# Patient Record
Sex: FEMALE | Race: WHITE | NOT HISPANIC OR LATINO | Employment: UNEMPLOYED | URBAN - METROPOLITAN AREA
[De-identification: names, ages, dates, MRNs, and addresses within clinical notes are randomized per-mention and may not be internally consistent; named-entity substitution may affect disease eponyms.]

---

## 2019-12-06 DIAGNOSIS — J84.9 ILD (INTERSTITIAL LUNG DISEASE) (H): Primary | ICD-10-CM

## 2020-01-10 NOTE — PROGRESS NOTES
[facility]- Interstitial Lung Disease Clinic    Assessment and Plan:  Ciera Chong is a 59 year old female with a history of seronegative RA vs psoriatic arthritis, positive FRED, breast cancer s/p bilateral mastectomy and chemotherapy (2006), anxiety, chronic pain on chronic opioids, gastric ulcers, LVH with diastolic CHF, and ILD, presenting to clinic today for evaluation of ILD.    Possible mild ILD, moderately severe restrictive lung disease, mild diffusion defect: Patient with a history of RA, imaging showing mild air trapping on imaging, possible ILD, with some improvement in GGO since last CT. She may have some ILD, such as RA-ILD causing small airways disease or HP, which is resolving. Given her frequent changes in RA medications, the improvement in today's imaging could be duet to more effective RA medication. The disease is quite mild, however, and is unlikely to explain her whole restriction present on PFTs. I believe her elevated right diaphragm may better explain her restriction, and may be a paralyzed diaphragm. No significant exposures identified. Obesity likely also contributing to restriction.   -Check ILD panel  -Will discuss case at ILD conference, and contact patient with results  -Continue current RA treatment  -Will initiate fluticasone inhaler for small airways disease, consider azithromycin and montelukast as well, pending ILD conference discussion. Advised to use a spacer with this  -continue PRN albuterol, gets benefit from this.   -Advised to get rid of down comforter, if she has one.   -work of of paralyzed kasi-diaphragm as below  -Referred to pulmonary rehab  -supplemental O2 as below.   -up to date on both pneumonia vaccines (2016 and 2017)  -Annual flu vaccination (up to date)    Hypoxia with exertion: Needed 2 LPM with exertion today. Already has this at home, as well as 4 LPM at night. Typically only uses nightly O2.  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 Likely contributing to restriction.   -Check SNIFF test  -May need thoracic surgery referral, vs bipap/cpap therapy at least at night.     Follow up in 3 months with PFTs, sooner if needed.     Roge Farrell MD   of Medicine  Division of Pulmonary, Critical Care, Allergy, and Sleep Medicine  Pager [phone]    Addendum:   Discussed at ILD conference. Minimal NSIP changes on CT as well as some mild air trapping. Oxygen requirement is more likely due to elevated diaphragm. At this point, would change inhaler to ICS/LABA (currently on fluticasone), would change to Breo or Advair, depending on insurance coverage. Continue current RA meds. Monitor PFTs in 3 months.     Roge Farrell MD   ______________________________________________________________  CC: \"ILD\"      HPI:   Ciera Chong is a 59 year old female with a history of seronegative RA vs psoriatic arthritis, positive FRED, breast cancer s/p bilateral mastectomy and chemotherapy (2006), anxiety, chronic pain on chronic opioids, gastric ulcers, LVH with diastolic CHF, and ILD, presenting to clinic today for evaluation of ILD.    She has previously followed with rheumatology and pulmonary in [place]. At most recent rheumatology visit in November, she mentioned that her dyspnea was worsening.     In regards to seronegative RA, she has a history of positive FRED with negative DNA and GAYLE antibodies. She also has some skin and nail changes concerning for psoriasis, with a family history of psoriasis, so psoriatic arthritis is also possible. She has tried many prior medications, including remicade (developed breast cancer while on), leflunomide (stopped due to LFT abnormalities), sulfasalazine, hydroxychloroquine, Methotrexate (intolerant), Otezla (not tolerated), Cimzia, IV Orencia (not effective), and prednisone. She is currently on " Cimzia and prednisone.     She last saw pulmonary (Beba Delarosa, NP) in Oct 2018. At that visit, it was noted that she was on 4 LPM supplemental O2 at night only. She was being treated with albuterol, and continued to complain of progressive dyspnea as well as daily cough with clear mucous. A CT chest was ordered, which showed stable GGO an minimal thickening of the interlobular septa concerning for NSIP. She presents today for a second opinion.     Ms. Chong states that a few years ago, she started to notice wheezing. She was given an inhaler to try. The inhaler did seem to help. Unfortunately, her breathing continued to worsen in the intervening years. She developed more dyspnea about 3 years ago. She was started on oxygen 4 LPM in about 2016, which has been stable. She gets out of breath with most activities, including showering or cooking. She feels she could walk for about 5 minutes without having to stop, but some of her walk distance is limited due to back pain. She does not use stairs. She typically uses oxygen only at night, but sometimes uses it during the day as well. She does have a cough, which tends to be productive of tan sputum, no hemoptysis. Cough occurs throughout the day, without noted exacerbating or alleviating factors, apart from resting. She does continue to wheeze, which often occurs with walking or activity. It typically doesn't happen at rest. She denies chest pain, palpitations. She does have mild LE edema, which is a bit worse than typical for her, in the last 6 months or so. She does have burning abdominal pain, heartburn (on nexium, which doesn't seem to work). No nausea or vomiting. She has some numbness in the fingers and toes, which seems to be worsening. This also started about 6 months ago, and she feels it is due to her back pain. She denies injury in the back, but has had prior back surgeries. She does have chronic neck, back, and foot pain. She does have some swelling of  her fingers as well, and her feet. No raynaud's phenomenon. She denies mouth ulcers, but has some sores in mouth. She also complains of dry mouth and dry eyes. She denies current skin rash/change.     She does have an albuterol inhaler, which she uses about 1 time per day, with benefit. She denies any personal history of asthma as a child.     She denies any family history of lung or autoimmune disease.     Exposure History:  Tobacco: former use, quit 2000. 7.5 pack years (1/2 ppd for 15 years)  Other inhaled substances: No marijuana, no vaping. No asbestos, sandblasting, welding, TB, spray painting.   Occupation: Not currently working. 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   Pets: She has a dog at home. No birds.   Allergies: No environmental allergies.   Hobbies: No significant outdoor exposures.   Travel: None  Home: Lives in house with . There are some mold issues in the bathroom, which were abated in Nov 2019. No change in breathing since abatement. She is unsure if she uses a down comforter. No feather pillow use. No hot tub use, no musical instrument use.     Allergies: Reviewed in EMR    Current medications: Reviewed in EMR  Current Outpatient Medications   Medication Sig Dispense Refill     albuterol (PROAIR HFA/PROVENTIL HFA/VENTOLIN HFA) 108 (90 Base) MCG/ACT inhaler Inhale 1-2 puffs into the lungs       amLODIPine (NORVASC) 5 MG tablet Take 5 mg by mouth       buPROPion (WELLBUTRIN SR) 200 MG 12 hr tablet Take 200 mg by mouth       cetirizine (ZYRTEC) 10 MG tablet Take 10 mg by mouth       cyclobenzaprine (FLEXERIL) 10 MG tablet Take 10 mg by mouth       diclofenac (VOLTAREN) 1 % topical gel        DULoxetine (CYMBALTA) 30 MG capsule Take 30 mg by mouth       DULoxetine (CYMBALTA) 60 MG capsule Take 60 mg by mouth       esomeprazole (NEXIUM) 40 MG DR capsule TAKE 1 CAPSULE BY MOUTH TWICE DAILY       fluticasone (FLONASE) 50 MCG/ACT nasal spray Spray 2 sprays in  nostril       melatonin 5 MG tablet Take 5-15 mg by mouth       mirtazapine (REMERON) 7.5 MG tablet Take 7.5 mg by mouth       Multiple Vitamin (THERA-TABS) TABS Take 1 tablet by mouth       oxyCODONE (OXYCONTIN) 20 MG 12 hr tablet Take 20 mg by mouth       oxyCODONE-acetaminophen (PERCOCET) 5-325 MG tablet Take 1-2 tablets by mouth       polyethylene glycol (MIRALAX/GLYCOLAX) powder Take 17 g by mouth       pravastatin (PRAVACHOL) 20 MG tablet Take 20 mg by mouth       predniSONE (DELTASONE) 1 MG tablet Take 5 mg by mouth       pregabalin (LYRICA) 150 MG capsule TAKE 1 CAPSULE BY MOUTH THREE TIMES DAILY       propranolol (INDERAL) 40 MG tablet Take 40 mg by mouth       sucralfate (CARAFATE) 1 GM/10ML suspension TAKE 2 TEASPOONSFUL (10ML) BY MOUTH 4 TIMES DAILY.       SUMAtriptan (IMITREX) 50 MG tablet Take 50 mg by mouth       zolpidem ER (AMBIEN CR) 6.25 MG CR tablet TAKE 1 TABLET BY MOUTH ONCE DAILY AT BEDTIME AS NEEDED FOR SLEEP         Histories: Personally reviewed during this visit.   Past Medical History:   Diagnosis Date     Anxiety      Breast cancer (H)      Chronic pain      Diastolic CHF (H)      Gastric ulcer      ILD (interstitial lung disease) (H)      LVH (left ventricular hypertrophy)      Seronegative rheumatoid arthritis (H)      Past Surgical History:   Procedure Laterality Date     AS REVISE BREAST RECONSTRUCTION       MASTECTOMY, BILATERAL       ROTATOR CUFF REPAIR RT/LT       SPINE SURGERY       Family History   Problem Relation Age of Onset     Esophageal Cancer Father      Heart Disease Father      Heart Disease Brother      Breast Cancer Sister      Breast Cancer Maternal Aunt         x2 aunts     Ovarian Cancer Maternal Aunt      Social History     Socioeconomic History     Marital status: Single     Spouse name: Not on file     Number of children: Not on file     Years of education: Not on file     Highest education level: Not on file   Occupational History     Not on file   Social Needs  "    Financial resource strain: Not on file     Food insecurity:     Worry: Not on file     Inability: Not on file     Transportation needs:     Medical: Not on file     Non-medical: Not on file   Tobacco Use     Smoking status: Former Smoker     Packs/day: 0.50     Years: 15.00     Pack years: 7.50     Last attempt to quit: 2000     Years since quittin.0     Smokeless tobacco: Never Used   Substance and Sexual Activity     Alcohol use: Not Currently     Drug use: Not Currently     Sexual activity: Not on file   Lifestyle     Physical activity:     Days per week: Not on file     Minutes per session: Not on file     Stress: Not on file   Relationships     Social connections:     Talks on phone: Not on file     Gets together: Not on file     Attends Tenri[employer] service: Not on file     Active member of club or organization: Not on file     Attends meetings of clubs or organizations: Not on file     Relationship status: Not on file     Intimate partner violence:     Fear of current or ex partner: Not on file     Emotionally abused: Not on file     Physically abused: Not on file     Forced sexual activity: Not on file   Other Topics Concern     Parent/sibling w/ CABG, MI or angioplasty before 65F 55M? Not Asked   Social History Narrative     Not on file       ROS: Complete 12 point ROS negative unless mentioned in HPI    Physical Exam:  /78   Pulse 76   Resp 18   Ht 1.702 m (5' 7\")   Wt 111.1 kg (245 lb)   SpO2 96%   BMI 38.37 kg/m      General: Sitting in the chair in NAD  HEENT: anicteric, dry mucosa. Poor dentition  Neck: trachea midline   Lymphatic: no cervical or supraclavicular lymphadenopathy   Chest: very mild crackles at right lung base medially, otherwise, CTAB, no wheezing, rhonchi. Normal WOB, on 2 LPM. Later during visit had some expiratory wheeze, but was not present during my exam.   Cardiac: RRR no murmurs  Abdomen: Soft, obese, non tender, active BS  Extremities: No LE Edema, moving " all extremities. No digital clubbing. Some pitting of finger nails. Mild ulnar deviation of fingers noted. Some swelling of PIP and MCP joints in hand, but no synovitis noted. No effusion on knees.   Neuro: A&Ox3, no focal defects. Speech/language wnl.   Skin: no rash noted on limited exam  Psych: normal affect.     Labs and Radiology: I have reviewed the results with the patient today.   All laboratory data reviewed   11/8/19 [facility]:  MPO, PR3, RF, AntiJo1, Scl-70, SSA, SSB, all negative  CCP 0.8  RNP 1.5  CRP 1.19  ESR 40  Cr 1.06    8/2/19 [facility]  WBC 12.3 Hgb 14 plts 421.     10/12/18 [facility]  FRED positive, 1:320 homogeneous   SCl-70 1.4 (still negative)  Smith Ab 1.9 (still negative)  Anti sydney 1 negative  SSA 0.5, SSB 0.6 (still negative)  RNP 0.7 (still negative)  Anti DNA negative    All cardiac studies reviewed by me.   12/28/18 Echo ([facility]): The left ventricle is normal in size. Left ventricular systolic function is normal. The estimated ejection fraction is 55-60%. Left ventricular segmental wall motion is normal. The right ventricle is normal in size. Normal right ventricular systolic function. The mitral valve is structurally normal. There is trace mitral regurgitation. Compared to prior study, there is no significant change.    All imaging studies reviewed by me.   1/13/20 CT chest ([facility]): 1. Decreased groundglass opacities throughout the lungs with mild residual mosaic attenuation and slight air trapping on expiratory  images. Findings are nonspecific but may be seen with small airways disease such as asthma or bronchiolitis obliterans and with interstitial lung disease such as hypersensitivity pneumonitis. 2. Hepatic steatosis. 3. Elevated right hemidiaphragm possibly diaphragmatic paralysis.   11/21/18 CT chest ([facility]): 1. Stable appearing ground-glass opacities primarily within the dependent portions of the lung parenchyma with minimal thickening of the interlobular septa.   Findings are compatible with an NSIP pattern.  No evidence of a UIP pattern. 2. No focal consolidation or pleural effusions.  3. Stable elevation right hemidiaphragm with minimal right basilar atelectasis. 4. No suspicious adenopathy. 5. Moderate broad-based disc osteophyte complex eccentric to the left centered at the T8-9 disc space mildly effacing the cord, chronic.  Correlation with radiculopathy in this region.  11/3/17 CT Chest ([facility]): 1. Central ground-glass opacities resolved with mild residual in the periphery of the lungs, suggestive of resolving atypical pneumonia, interstitial pneumonitis such as DIP or NSIP, eosinophilic pneumonia, and hypersensitivity pneumonitis. 2. Elevated right hemidiaphragm with chronic right lower lobe atelectasis. 3. Dilated or enlarged esophagus.  There may be reflux or achalasia.  Recommend esophagram or EGD correlation.     PFT's:  Pulmonary Function Testing: personally reviewed.   Date and Site FEV1 FVC FEV1/FVC TLC RV DLCO   8/17/17 [facility] 2.07 (70%) 2.43 (64%) 85% 4.13 (75%)  15.87 (58%)   11/20/18 [facility] 1.67 (57%) 1.94 (51%) 86% 3.51 (63%)  12.13 (45%)   1/13/20 [facility] 1.48 (56%) 1.76 (53%) 84% 4.21 (77%) 2.19 (108%) 15.07 (66%)   Today's PFT interpretation: Moderately severe restriction present, not significantly worse than 2018 PFTs, with mild diffusion defect, improved from prior.     1/13/20 Six minute walk: Walk started on RA, but patient desaturated to 88%, placed on 2 LPM. Walk continued, patient had significant desaturation, but no hypoxia on 2 LPM (SpO2 dropped from 96 to 92% on 2 LPM, 88% on RA). Walk distance reduced, 265 m vs .

## 2020-01-13 ENCOUNTER — ANCILLARY PROCEDURE (OUTPATIENT)
Dept: CT IMAGING | Facility: CLINIC | Age: 60
End: 2020-01-13
Attending: INTERNAL MEDICINE
Payer: COMMERCIAL

## 2020-01-13 ENCOUNTER — OFFICE VISIT (OUTPATIENT)
Dept: PULMONOLOGY | Facility: CLINIC | Age: 60
End: 2020-01-13
Attending: PSYCHIATRY & NEUROLOGY
Payer: COMMERCIAL

## 2020-01-13 VITALS
WEIGHT: 245 LBS | BODY MASS INDEX: 38.45 KG/M2 | OXYGEN SATURATION: 96 % | SYSTOLIC BLOOD PRESSURE: 124 MMHG | DIASTOLIC BLOOD PRESSURE: 78 MMHG | HEART RATE: 76 BPM | HEIGHT: 67 IN | RESPIRATION RATE: 18 BRPM

## 2020-01-13 DIAGNOSIS — J98.4 SMALL AIRWAYS DISEASE: ICD-10-CM

## 2020-01-13 DIAGNOSIS — J98.6 PARALYZED HEMIDIAPHRAGM: ICD-10-CM

## 2020-01-13 DIAGNOSIS — R19.8 ABDOMINAL BURNING SENSATION IN RIGHT UPPER QUADRANT: ICD-10-CM

## 2020-01-13 DIAGNOSIS — J84.9 ILD (INTERSTITIAL LUNG DISEASE) (H): Primary | ICD-10-CM

## 2020-01-13 DIAGNOSIS — R09.02 HYPOXIA: ICD-10-CM

## 2020-01-13 DIAGNOSIS — J84.9 ILD (INTERSTITIAL LUNG DISEASE) (H): ICD-10-CM

## 2020-01-13 LAB
6 MIN WALK (FT): 525 FT
6 MIN WALK (M): 160 M
ALBUMIN SERPL-MCNC: 3.3 G/DL (ref 3.4–5)
ALP SERPL-CCNC: 96 U/L (ref 40–150)
ALT SERPL W P-5'-P-CCNC: 43 U/L (ref 0–50)
AST SERPL W P-5'-P-CCNC: 27 U/L (ref 0–45)
BILIRUB DIRECT SERPL-MCNC: 0.1 MG/DL (ref 0–0.2)
BILIRUB SERPL-MCNC: 0.2 MG/DL (ref 0.2–1.3)
CK SERPL-CCNC: 67 U/L (ref 30–225)
CRP SERPL-MCNC: 10.2 MG/L (ref 0–8)
ERYTHROCYTE [DISTWIDTH] IN BLOOD BY AUTOMATED COUNT: 13.3 % (ref 10–15)
ERYTHROCYTE [SEDIMENTATION RATE] IN BLOOD BY WESTERGREN METHOD: 31 MM/H (ref 0–30)
HCT VFR BLD AUTO: 39.3 % (ref 35–47)
HGB BLD-MCNC: 13 G/DL (ref 11.7–15.7)
MCH RBC QN AUTO: 30.6 PG (ref 26.5–33)
MCHC RBC AUTO-ENTMCNC: 33.1 G/DL (ref 31.5–36.5)
MCV RBC AUTO: 93 FL (ref 78–100)
PLATELET # BLD AUTO: 289 10E9/L (ref 150–450)
PROT SERPL-MCNC: 7.7 G/DL (ref 6.8–8.8)
RBC # BLD AUTO: 4.25 10E12/L (ref 3.8–5.2)
WBC # BLD AUTO: 7.2 10E9/L (ref 4–11)

## 2020-01-13 PROCEDURE — 86255 FLUORESCENT ANTIBODY SCREEN: CPT | Performed by: INTERNAL MEDICINE

## 2020-01-13 PROCEDURE — 86235 NUCLEAR ANTIGEN ANTIBODY: CPT | Performed by: INTERNAL MEDICINE

## 2020-01-13 PROCEDURE — 85027 COMPLETE CBC AUTOMATED: CPT | Performed by: INTERNAL MEDICINE

## 2020-01-13 PROCEDURE — 85652 RBC SED RATE AUTOMATED: CPT | Performed by: INTERNAL MEDICINE

## 2020-01-13 PROCEDURE — 82787 IGG 1 2 3 OR 4 EACH: CPT | Performed by: INTERNAL MEDICINE

## 2020-01-13 PROCEDURE — 86331 IMMUNODIFFUSION OUCHTERLONY: CPT | Performed by: INTERNAL MEDICINE

## 2020-01-13 PROCEDURE — 86431 RHEUMATOID FACTOR QUANT: CPT | Performed by: INTERNAL MEDICINE

## 2020-01-13 PROCEDURE — 36415 COLL VENOUS BLD VENIPUNCTURE: CPT | Performed by: INTERNAL MEDICINE

## 2020-01-13 PROCEDURE — 86606 ASPERGILLUS ANTIBODY: CPT | Performed by: INTERNAL MEDICINE

## 2020-01-13 PROCEDURE — 82085 ASSAY OF ALDOLASE: CPT | Performed by: INTERNAL MEDICINE

## 2020-01-13 PROCEDURE — 86140 C-REACTIVE PROTEIN: CPT | Performed by: INTERNAL MEDICINE

## 2020-01-13 PROCEDURE — 82784 ASSAY IGA/IGD/IGG/IGM EACH: CPT | Performed by: INTERNAL MEDICINE

## 2020-01-13 PROCEDURE — 86200 CCP ANTIBODY: CPT | Performed by: INTERNAL MEDICINE

## 2020-01-13 PROCEDURE — 86039 ANTINUCLEAR ANTIBODIES (ANA): CPT | Performed by: INTERNAL MEDICINE

## 2020-01-13 PROCEDURE — 82550 ASSAY OF CK (CPK): CPT | Performed by: INTERNAL MEDICINE

## 2020-01-13 PROCEDURE — 86038 ANTINUCLEAR ANTIBODIES: CPT | Performed by: INTERNAL MEDICINE

## 2020-01-13 PROCEDURE — 80076 HEPATIC FUNCTION PANEL: CPT | Performed by: INTERNAL MEDICINE

## 2020-01-13 RX ORDER — OXYCODONE AND ACETAMINOPHEN 5; 325 MG/1; MG/1
1-2 TABLET ORAL
COMMUNITY
Start: 2020-01-06

## 2020-01-13 RX ORDER — PROPRANOLOL HYDROCHLORIDE 40 MG/1
40 TABLET ORAL
COMMUNITY
Start: 2019-07-19

## 2020-01-13 RX ORDER — ESOMEPRAZOLE MAGNESIUM 40 MG/1
CAPSULE, DELAYED RELEASE ORAL
COMMUNITY
Start: 2019-09-23

## 2020-01-13 RX ORDER — FLUTICASONE PROPIONATE 220 UG/1
1 AEROSOL, METERED RESPIRATORY (INHALATION) 2 TIMES DAILY
Qty: 12 G | Refills: 3 | Status: SHIPPED | OUTPATIENT
Start: 2020-01-13 | End: 2020-10-21

## 2020-01-13 RX ORDER — OXYCODONE HCL 20 MG/1
20 TABLET, FILM COATED, EXTENDED RELEASE ORAL
COMMUNITY
Start: 2020-01-13

## 2020-01-13 RX ORDER — ALBUTEROL SULFATE 90 UG/1
1-2 AEROSOL, METERED RESPIRATORY (INHALATION)
COMMUNITY
Start: 2018-06-28 | End: 2022-05-26

## 2020-01-13 RX ORDER — POLYETHYLENE GLYCOL 3350 17 G/17G
17 POWDER, FOR SOLUTION ORAL
COMMUNITY
Start: 2020-01-09

## 2020-01-13 RX ORDER — ZOLPIDEM TARTRATE 6.25 MG/1
TABLET, FILM COATED, EXTENDED RELEASE ORAL
COMMUNITY
Start: 2019-12-04

## 2020-01-13 RX ORDER — MULTIVITAMIN,THERAPEUTIC
1 TABLET ORAL
COMMUNITY

## 2020-01-13 RX ORDER — PREGABALIN 150 MG/1
CAPSULE ORAL
COMMUNITY
Start: 2020-01-06

## 2020-01-13 RX ORDER — CETIRIZINE HYDROCHLORIDE 10 MG/1
10 TABLET ORAL
COMMUNITY
Start: 2019-07-19

## 2020-01-13 RX ORDER — AMLODIPINE BESYLATE 5 MG/1
5 TABLET ORAL
COMMUNITY
Start: 2019-07-19

## 2020-01-13 RX ORDER — DULOXETIN HYDROCHLORIDE 30 MG/1
30 CAPSULE, DELAYED RELEASE ORAL
COMMUNITY
Start: 2019-07-19

## 2020-01-13 RX ORDER — MIRTAZAPINE 7.5 MG/1
7.5 TABLET, FILM COATED ORAL
COMMUNITY
Start: 2019-07-19 | End: 2020-07-18

## 2020-01-13 RX ORDER — DULOXETIN HYDROCHLORIDE 60 MG/1
60 CAPSULE, DELAYED RELEASE ORAL
COMMUNITY
Start: 2019-07-19

## 2020-01-13 RX ORDER — SUCRALFATE ORAL 1 G/10ML
SUSPENSION ORAL
COMMUNITY
Start: 2019-09-26

## 2020-01-13 RX ORDER — SUMATRIPTAN 50 MG/1
50 TABLET, FILM COATED ORAL
COMMUNITY
Start: 2019-08-23

## 2020-01-13 RX ORDER — PREDNISONE 1 MG/1
5 TABLET ORAL
COMMUNITY
Start: 2019-10-19

## 2020-01-13 RX ORDER — BUPROPION HYDROCHLORIDE 200 MG/1
200 TABLET, EXTENDED RELEASE ORAL
COMMUNITY
Start: 2019-07-19

## 2020-01-13 RX ORDER — CYCLOBENZAPRINE HCL 10 MG
10 TABLET ORAL
COMMUNITY
Start: 2019-11-21

## 2020-01-13 RX ORDER — PRAVASTATIN SODIUM 20 MG
20 TABLET ORAL
COMMUNITY
Start: 2019-07-19

## 2020-01-13 RX ORDER — FLUTICASONE PROPIONATE 50 MCG
2 SPRAY, SUSPENSION (ML) NASAL
COMMUNITY
Start: 2019-07-19

## 2020-01-13 ASSESSMENT — PAIN SCALES - GENERAL: PAINLEVEL: MODERATE PAIN (5)

## 2020-01-13 ASSESSMENT — MIFFLIN-ST. JEOR: SCORE: 1723.94

## 2020-01-13 NOTE — PATIENT INSTRUCTIONS
We will look to do a SNIFF test to test for a paralyzed diaphragm    Blood work today    Breathing tests at follow up in 3 months    We will discuss your case at our ILD conference in the next few weeks, and let you know the results    We will start a fluticasone inhaler (use with spacer) twice a day to help with small airways disease. Take this no matter what, continue to use the albuterol if needed. Rinse out mouth after use.     Referral to pulmonary rehabilitation.

## 2020-01-13 NOTE — NURSING NOTE
Chief Complaint   Patient presents with     Consult     ILD/CTD    Medications reviewed and vital signs taken.   Radha Bennett, CHELY

## 2020-01-13 NOTE — LETTER
1/13/2020       RE: Ciera Chong  [address]  [facility] [address]     Dear Colleague,    Thank you for referring your patient, Ciera Chong, to the [facility] FOR LUNG SCIENCE AND HEALTH at [facility]. Please see a copy of my visit note below.    [facility] Lung Science and Health- Interstitial Lung Disease Clinic    Assessment and Plan:  Ciera Chong is a 59 year old female with a history of seronegative RA vs psoriatic arthritis, positive FRED, breast cancer s/p bilateral mastectomy and chemotherapy (2006), anxiety, chronic pain on chronic opioids, gastric ulcers, LVH with diastolic CHF, and ILD, presenting to clinic today for evaluation of ILD.    Possible mild ILD, moderately severe restrictive lung disease, mild diffusion defect: Patient with a history of RA, imaging showing mild air trapping on imaging, possible ILD, with some improvement in GGO since last CT. She may have some ILD, such as RA-ILD causing small airways disease or HP, which is resolving. Given her frequent changes in RA medications, the improvement in today's imaging could be duet to more effective RA medication. The disease is quite mild, however, and is unlikely to explain her whole restriction present on PFTs. I believe her elevated right diaphragm may better explain her restriction, and may be a paralyzed diaphragm. No significant exposures identified. Obesity likely also contributing to restriction.   -Check ILD panel  -Will discuss case at ILD conference, and contact patient with results  -Continue current RA treatment  -Will initiate fluticasone inhaler for small airways disease, consider azithromycin and montelukast as well, pending ILD conference discussion. Advised to use a spacer with this  -continue PRN albuterol, gets benefit from this.   -Advised to get rid of down comforter, if she has one.   -work of of paralyzed kasi-diaphragm as below  -Referred to pulmonary  "rehab  -supplemental O2 as below.   -up to date on both pneumonia vaccines (2016 and 2017)  -Annual flu vaccination (up to date)    Hypoxia with exertion: Needed 2 LPM with exertion today. Already has this at home, as well as 4 LPM at night. Typically only uses nightly O2.  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 Likely contributing to restriction.   -Check SNIFF test  -May need thoracic surgery referral, vs bipap/cpap therapy at least at night.     Follow up in 3 months with PFTs, sooner if needed.     Roge Farrell MD   of Medicine  Division of Pulmonary, Critical Care, Allergy, and Sleep Medicine  Pager [phone]  ______________________________________________________________  CC: \"ILD\"      HPI:   Ciera Chong is a 59 year old female with a history of seronegative RA vs psoriatic arthritis, positive FRED, breast cancer s/p bilateral mastectomy and chemotherapy (2006), anxiety, chronic pain on chronic opioids, gastric ulcers, LVH with diastolic CHF, and ILD, presenting to clinic today for evaluation of ILD.    She has previously followed with rheumatology and pulmonary in [place]. At most recent rheumatology visit in November, she mentioned that her dyspnea was worsening.     In regards to seronegative RA, she has a history of positive FRED with negative DNA and GAYLE antibodies. She also has some skin and nail changes concerning for psoriasis, with a family history of psoriasis, so psoriatic arthritis is also possible. She has tried many prior medications, including remicade (developed breast cancer while on), leflunomide (stopped due to LFT abnormalities), sulfasalazine, hydroxychloroquine, Methotrexate (intolerant), Otezla (not tolerated), Cimzia, IV Orencia (not effective), and prednisone. She is currently on Cimzia and prednisone.     She last saw pulmonary (Beba Delarosa NP) in Oct 2018. " At that visit, it was noted that she was on 4 LPM supplemental O2 at night only. She was being treated with albuterol, and continued to complain of progressive dyspnea as well as daily cough with clear mucous. A CT chest was ordered, which showed stable GGO an minimal thickening of the interlobular septa concerning for NSIP. She presents today for a second opinion.     Ms. Chong states that a few years ago, she started to notice wheezing. She was given an inhaler to try. The inhaler did seem to help. Unfortunately, her breathing continued to worsen in the intervening years. She developed more dyspnea about 3 years ago. She was started on oxygen 4 LPM in about 2016, which has been stable. She gets out of breath with most activities, including showering or cooking. She feels she could walk for about 5 minutes without having to stop, but some of her walk distance is limited due to back pain. She does not use stairs. She typically uses oxygen only at night, but sometimes uses it during the day as well. She does have a cough, which tends to be productive of tan sputum, no hemoptysis. Cough occurs throughout the day, without noted exacerbating or alleviating factors, apart from resting. She does continue to wheeze, which often occurs with walking or activity. It typically doesn't happen at rest. She denies chest pain, palpitations. She does have mild LE edema, which is a bit worse than typical for her, in the last 6 months or so. She does have burning abdominal pain, heartburn (on nexium, which doesn't seem to work). No nausea or vomiting. She has some numbness in the fingers and toes, which seems to be worsening. This also started about 6 months ago, and she feels it is due to her back pain. She denies injury in the back, but has had prior back surgeries. She does have chronic neck, back, and foot pain. She does have some swelling of her fingers as well, and her feet. No raynaud's phenomenon. She denies mouth ulcers,  but has some sores in mouth. She also complains of dry mouth and dry eyes. She denies current skin rash/change.     She does have an albuterol inhaler, which she uses about 1 time per day, with benefit. She denies any personal history of asthma as a child.     She denies any family history of lung or autoimmune disease.     Exposure History:  Tobacco: former use, quit 2000. 7.5 pack years (1/2 ppd for 15 years)  Other inhaled substances: No marijuana, no vaping. No asbestos, sandblasting, welding, TB, spray painting.   Occupation: Not currently working. Used to work in a [employer]- no known respiratory irritant exposures.   Pets: She has a dog at home. No birds.   Allergies: No environmental allergies.   Hobbies: No significant outdoor exposures.   Travel: None  Home: Lives in house with . There are some mold issues in the bathroom, which were abated in Nov 2019. No change in breathing since abatement. She is unsure if she uses a down comforter. No feather pillow use. No hot tub use, no musical instrument use.     Allergies: Reviewed in EMR    Current medications: Reviewed in EMR  Current Outpatient Medications   Medication Sig Dispense Refill     albuterol (PROAIR HFA/PROVENTIL HFA/VENTOLIN HFA) 108 (90 Base) MCG/ACT inhaler Inhale 1-2 puffs into the lungs       amLODIPine (NORVASC) 5 MG tablet Take 5 mg by mouth       buPROPion (WELLBUTRIN SR) 200 MG 12 hr tablet Take 200 mg by mouth       cetirizine (ZYRTEC) 10 MG tablet Take 10 mg by mouth       cyclobenzaprine (FLEXERIL) 10 MG tablet Take 10 mg by mouth       diclofenac (VOLTAREN) 1 % topical gel        DULoxetine (CYMBALTA) 30 MG capsule Take 30 mg by mouth       DULoxetine (CYMBALTA) 60 MG capsule Take 60 mg by mouth       esomeprazole (NEXIUM) 40 MG DR capsule TAKE 1 CAPSULE BY MOUTH TWICE DAILY       fluticasone (FLONASE) 50 MCG/ACT nasal spray Spray 2 sprays in nostril       melatonin 5 MG tablet Take 5-15 mg by mouth       mirtazapine (REMERON) 7.5  MG tablet Take 7.5 mg by mouth       Multiple Vitamin (THERA-TABS) TABS Take 1 tablet by mouth       oxyCODONE (OXYCONTIN) 20 MG 12 hr tablet Take 20 mg by mouth       oxyCODONE-acetaminophen (PERCOCET) 5-325 MG tablet Take 1-2 tablets by mouth       polyethylene glycol (MIRALAX/GLYCOLAX) powder Take 17 g by mouth       pravastatin (PRAVACHOL) 20 MG tablet Take 20 mg by mouth       predniSONE (DELTASONE) 1 MG tablet Take 5 mg by mouth       pregabalin (LYRICA) 150 MG capsule TAKE 1 CAPSULE BY MOUTH THREE TIMES DAILY       propranolol (INDERAL) 40 MG tablet Take 40 mg by mouth       sucralfate (CARAFATE) 1 GM/10ML suspension TAKE 2 TEASPOONSFUL (10ML) BY MOUTH 4 TIMES DAILY.       SUMAtriptan (IMITREX) 50 MG tablet Take 50 mg by mouth       zolpidem ER (AMBIEN CR) 6.25 MG CR tablet TAKE 1 TABLET BY MOUTH ONCE DAILY AT BEDTIME AS NEEDED FOR SLEEP         Histories: Personally reviewed during this visit.   Past Medical History:   Diagnosis Date     Anxiety      Breast cancer (H)      Chronic pain      Diastolic CHF (H)      Gastric ulcer      ILD (interstitial lung disease) (H)      LVH (left ventricular hypertrophy)      Seronegative rheumatoid arthritis (H)      Past Surgical History:   Procedure Laterality Date     AS REVISE BREAST RECONSTRUCTION       MASTECTOMY, BILATERAL       ROTATOR CUFF REPAIR RT/LT       SPINE SURGERY       Family History   Problem Relation Age of Onset     Esophageal Cancer Father      Heart Disease Father      Heart Disease Brother      Breast Cancer Sister      Breast Cancer Maternal Aunt         x2 aunts     Ovarian Cancer Maternal Aunt      Social History     Socioeconomic History     Marital status: Single     Spouse name: Not on file     Number of children: Not on file     Years of education: Not on file     Highest education level: Not on file   Occupational History     Not on file   Social Needs     Financial resource strain: Not on file     Food insecurity:     Worry: Not on file     " Inability: Not on file     Transportation needs:     Medical: Not on file     Non-medical: Not on file   Tobacco Use     Smoking status: Former Smoker     Packs/day: 0.50     Years: 15.00     Pack years: 7.50     Last attempt to quit: 2000     Years since quittin.0     Smokeless tobacco: Never Used   Substance and Sexual Activity     Alcohol use: Not Currently     Drug use: Not Currently     Sexual activity: Not on file   Lifestyle     Physical activity:     Days per week: Not on file     Minutes per session: Not on file     Stress: Not on file   Relationships     Social connections:     Talks on phone: Not on file     Gets together: Not on file     Attends Zoroastrianism service: Not on file     Active member of club or organization: Not on file     Attends meetings of clubs or organizations: Not on file     Relationship status: Not on file     Intimate partner violence:     Fear of current or ex partner: Not on file     Emotionally abused: Not on file     Physically abused: Not on file     Forced sexual activity: Not on file   Other Topics Concern     Parent/sibling w/ CABG, MI or angioplasty before 65F 55M? Not Asked   Social History Narrative     Not on file       ROS: Complete 12 point ROS negative unless mentioned in HPI    Physical Exam:  /78   Pulse 76   Resp 18   Ht 1.702 m (5' 7\")   Wt 111.1 kg (245 lb)   SpO2 96%   BMI 38.37 kg/m       General: Sitting in the chair in NAD  HEENT: anicteric, dry mucosa. Poor dentition  Neck: trachea midline   Lymphatic: no cervical or supraclavicular lymphadenopathy   Chest: very mild crackles at right lung base medially, otherwise, CTAB, no wheezing, rhonchi. Normal WOB, on 2 LPM. Later during visit had some expiratory wheeze, but was not present during my exam.   Cardiac: RRR no murmurs  Abdomen: Soft, obese, non tender, active BS  Extremities: No LE Edema, moving all extremities. No digital clubbing. Some pitting of finger nails. Mild ulnar deviation of " fingers noted. Some swelling of PIP and MCP joints in hand, but no synovitis noted. No effusion on knees.   Neuro: A&Ox3, no focal defects. Speech/language wnl.   Skin: no rash noted on limited exam  Psych: normal affect.     Labs and Radiology: I have reviewed the results with the patient today.   All laboratory data reviewed   11/8/19 [facility]:  MPO, PR3, RF, AntiJo1, Scl-70, SSA, SSB, all negative  CCP 0.8  RNP 1.5  CRP 1.19  ESR 40  Cr 1.06    8/2/19 [facility]  WBC 12.3 Hgb 14 plts 421.     10/12/18 [facility]  FRED positive, 1:320 homogeneous   SCl-70 1.4 (still negative)  Smith Ab 1.9 (still negative)  Anti sydney 1 negative  SSA 0.5, SSB 0.6 (still negative)  RNP 0.7 (still negative)  Anti DNA negative    All cardiac studies reviewed by me.   12/28/18 Echo ([facility]): The left ventricle is normal in size. Left ventricular systolic function is normal. The estimated ejection fraction is 55-60%. Left ventricular segmental wall motion is normal. The right ventricle is normal in size. Normal right ventricular systolic function. The mitral valve is structurally normal. There is trace mitral regurgitation. Compared to prior study, there is no significant change.    All imaging studies reviewed by me.   1/13/20 CT chest ([facility]): 1. Decreased groundglass opacities throughout the lungs with mild residual mosaic attenuation and slight air trapping on expiratory  images. Findings are nonspecific but may be seen with small airways disease such as asthma or bronchiolitis obliterans and with interstitial lung disease such as hypersensitivity pneumonitis. 2. Hepatic steatosis. 3. Elevated right hemidiaphragm possibly diaphragmatic paralysis.   11/21/18 CT chest ([facility]): 1. Stable appearing ground-glass opacities primarily within the dependent portions of the lung parenchyma with minimal thickening of the interlobular septa.  Findings are compatible with an NSIP pattern.  No evidence of a UIP pattern. 2. No focal  consolidation or pleural effusions.  3. Stable elevation right hemidiaphragm with minimal right basilar atelectasis. 4. No suspicious adenopathy. 5. Moderate broad-based disc osteophyte complex eccentric to the left centered at the T8-9 disc space mildly effacing the cord, chronic.  Correlation with radiculopathy in this region.  11/3/17 CT Chest ([facility]): 1. Central ground-glass opacities resolved with mild residual in the periphery of the lungs, suggestive of resolving atypical pneumonia, interstitial pneumonitis such as DIP or NSIP, eosinophilic pneumonia, and hypersensitivity pneumonitis. 2. Elevated right hemidiaphragm with chronic right lower lobe atelectasis. 3. Dilated or enlarged esophagus.  There may be reflux or achalasia.  Recommend esophagram or EGD correlation.     PFT's:  Pulmonary Function Testing: personally reviewed.   Date and Site FEV1 FVC FEV1/FVC TLC RV DLCO   8/17/17 [facility] 2.07 (70%) 2.43 (64%) 85% 4.13 (75%)  15.87 (58%)   11/20/18 [facility] 1.67 (57%) 1.94 (51%) 86% 3.51 (63%)  12.13 (45%)   1/13/20 [facility] 1.48 (56%) 1.76 (53%) 84% 4.21 (77%) 2.19 (108%) 15.07 (66%)   Today's PFT interpretation: Moderately severe restriction present, not significantly worse than 2018 PFTs, with mild diffusion defect, improved from prior.     1/13/20 Six minute walk: Walk started on RA, but patient desaturated to 88%, placed on 2 LPM. Walk continued, patient had significant desaturation, but no hypoxia on 2 LPM (SpO2 dropped from 96 to 92% on 2 LPM, 88% on RA). Walk distance reduced, 265 m vs .     Again, thank you for allowing me to participate in the care of your patient.      Sincerely,    Roge Farrell MD

## 2020-01-14 ENCOUNTER — DOCUMENTATION ONLY (OUTPATIENT)
Dept: GASTROENTEROLOGY | Facility: CLINIC | Age: 60
End: 2020-01-14

## 2020-01-14 LAB
ANA PAT SER IF-IMP: ABNORMAL
ANA PAT SER IF-IMP: ABNORMAL
ANA SER QL IF: POSITIVE
ANA TITR SER IF: ABNORMAL {TITER}
ANA TITR SER IF: ABNORMAL {TITER}
ANCA AB PATTERN SER IF-IMP: NORMAL
C-ANCA TITR SER IF: NORMAL {TITER}
CCP AB SER IA-ACNC: 2 U/ML
ENA JO1 IGG SER-ACNC: <0.2 AI (ref 0–0.9)
ENA SCL70 IGG SER IA-ACNC: <0.2 AI (ref 0–0.9)
ENA SS-A IGG SER IA-ACNC: <0.2 AI (ref 0–0.9)
ENA SS-B IGG SER IA-ACNC: <0.2 AI (ref 0–0.9)
IGG SERPL-MCNC: 1208 MG/DL (ref 610–1616)
IGG1 SER-MCNC: 847 MG/DL (ref 382–929)
IGG2 SER-MCNC: 256 MG/DL (ref 242–700)
IGG3 SER-MCNC: 97 MG/DL (ref 22–176)
IGG4 SER-MCNC: 23 MG/DL (ref 4–86)
RHEUMATOID FACT SER NEPH-ACNC: 21 IU/ML (ref 0–20)

## 2020-01-14 NOTE — PROGRESS NOTES
REFERRAL REVIEW FORM    Is this a second opinion from another GI physician?  (If yes, OK to refer to for an MD only clinic visit)  No    Reason for referral: [place]    Date records reviewed: January 15, 2020     Previous work up:    Labs  EGD  Colonoscopy  GI evaluation    Summary of pertinent GI work-up:  Colonoscopy 3/1/19: Diverticulosis in the recto-sigmoid colon and in the sigmoid colon, otherwise normal colon and terminal ileum.    EGD 5/26/18: Food in stomach, otherwise normal.     Recommendation:    Schedule clinic appointment with general GI provider (MD, NATALIYA or fellow) - Choose this if patient has not been seen by a GI provider for this problem    When to schedule:  Next available slot    Comments:

## 2020-01-14 NOTE — PROGRESS NOTES
Referring Provider and Clinic: Roge Farrell MD (ALL RECS IN CE/epic)     Diagnosis:  Abdominal burning sensation in right upper quadrant    GI notes or primary provider notes related to GI problem:   Y     Pathology Reports: Y    Recent Lab Reports: Y    Radiology Reports (CT/MRI) : Y    Endoscopy: Y    Colonoscopy: Y    Notes: Recs fwd to Ritchie LEE to review recs 1/14/20 7:29AM      Referral Date: 1/13/20    Date complete records received and sent for review:     Date records scanned into epic:     Provider Review Date:     Date review routed back to sender:     Letter sent:     Notes:

## 2020-01-14 NOTE — LETTER
"    January 25, 2020       TO: Ciera Chong  [address]  [facility] [address]         Dear Ms. Chong,    Our records indicate that you have not scheduled an appointment for a Gastroenterology Consult, as recommended by Dr. Provider Unknown. If you wish to schedule within eal, we have several options to help you schedule your appointment:      Call [phone]    Visit our website at [email or web address] and click \"I Want To\" (in upper right) and select Request an Appointment    Request an appointment via [email or web address]     If you have chosen to schedule elsewhere or if you have already made an appointment, please disregard this letter.    If you have any questions or concerns regarding the information above, please contact the [facility] GASTROENTEROLOGY AND IBD CLINIC at [phone].      Sincerely,      [facility] IBD [facility]      "

## 2020-01-15 LAB
ALDOLASE SERPL-CCNC: 7.4 U/L (ref 1.5–8.1)
DLCOUNC-%PRED-PRE: 66 %
DLCOUNC-PRE: 15.07 ML/MIN/MMHG
DLCOUNC-PRED: 22.52 ML/MIN/MMHG
ERV-%PRED-PRE: 10 %
ERV-PRE: 0.05 L
ERV-PRED: 0.46 L
EXPTIME-PRE: 4.98 SEC
FEF2575-%PRED-PRE: 74 %
FEF2575-PRE: 1.79 L/SEC
FEF2575-PRED: 2.39 L/SEC
FEFMAX-%PRED-PRE: 63 %
FEFMAX-PRE: 4.34 L/SEC
FEFMAX-PRED: 6.87 L/SEC
FEV1-%PRED-PRE: 56 %
FEV1-PRE: 1.48 L
FEV1FEV6-PRE: 84 %
FEV1FEV6-PRED: 81 %
FEV1FVC-PRE: 84 %
FEV1FVC-PRED: 80 %
FEV1SVC-PRE: 73 %
FEV1SVC-PRED: 71 %
FIFMAX-PRE: 4.45 L/SEC
FIO2-PRE: 28 %
FRCPLETH-%PRED-PRE: 77 %
FRCPLETH-PRE: 2.24 L
FRCPLETH-PRED: 2.87 L
FVC-%PRED-PRE: 53 %
FVC-PRE: 1.76 L
FVC-PRED: 3.3 L
IC-%PRED-PRE: 60 %
IC-PRE: 1.97 L
IC-PRED: 3.23 L
RVPLETH-%PRED-PRE: 108 %
RVPLETH-PRE: 2.19 L
RVPLETH-PRED: 2.01 L
TLCPLETH-%PRED-PRE: 77 %
TLCPLETH-PRE: 4.21 L
TLCPLETH-PRED: 5.44 L
VA-%PRED-PRE: 57 %
VA-PRE: 3.11 L
VC-%PRED-PRE: 54 %
VC-PRE: 2.02 L
VC-PRED: 3.69 L

## 2020-01-18 LAB
A FLAVUS AB SER QL ID: ABNORMAL
A FUMIGATUS1 AB SER QL ID: ABNORMAL
A FUMIGATUS2 AB SER QL ID: ABNORMAL
A FUMIGATUS3 AB SER QL ID: ABNORMAL
A FUMIGATUS6 AB SER QL ID: ABNORMAL
A PULLULANS AB SER QL ID: ABNORMAL
PIGEON DROP IGE QN: ABNORMAL
S RECTIVIRGULA AB SER QL ID: DETECTED
S VIRIDIS AB SER QL ID: DETECTED
T CANDIDUS AB SER QL: DETECTED
T VULGARIS AB SER QL ID: DETECTED

## 2020-02-18 ENCOUNTER — PATIENT OUTREACH (OUTPATIENT)
Dept: PULMONOLOGY | Facility: CLINIC | Age: 60
End: 2020-02-18

## 2020-02-18 DIAGNOSIS — J84.9 ILD (INTERSTITIAL LUNG DISEASE) (H): Primary | ICD-10-CM

## 2020-02-18 NOTE — PROGRESS NOTES
Patient returned phone call. Review discussion from ILD conference. Plan to switch from Fluticasone to Breo and to get sniff test done at follow up. Patient agreed with plan.

## 2020-02-19 DIAGNOSIS — J98.4 SMALL AIRWAYS DISEASE: Primary | ICD-10-CM

## 2020-02-19 RX ORDER — FLUTICASONE PROPIONATE AND SALMETEROL XINAFOATE 230; 21 UG/1; UG/1
2 AEROSOL, METERED RESPIRATORY (INHALATION) 2 TIMES DAILY
Qty: 1 INHALER | Refills: 11 | Status: SHIPPED | OUTPATIENT
Start: 2020-02-19 | End: 2020-10-21

## 2020-03-04 NOTE — TELEPHONE ENCOUNTER
RECORDS RECEIVED FROM: [facility] - Dr Roge Farrell    DATE RECEIVED: 6/16/2020   NOTES STATUS DETAILS   OFFICE NOTE from referring provider Internal 1/13/2020 OV with Dr Farrell    OFFICE NOTE from other specialist Care Everywhere 1/18/19 OV with Jovita Campos MB,[facility]  ([facility]  )   DISCHARGE SUMMARY from hospital N/A    OPERATIVE REPORT Care Everywhere  06/26/2018 & 8/12/16 EGD ([facility]  )   MEDICATION LIST Internal         ENDOSCOPY  N/A    COLONOSCOPY Care Everywhere 3/1/19 COLONOSCOPY @ [facility]       ERCP N/A    EUS N/A    STOOL TESTING N/A    PERTINENT LABS N/A    PATHOLOGY REPORTS (RELATED) N/A    IMAGING (CT, MRI, EGD) Care Everywhere [facility] Images - req 3/4/2020  10/8/16 CT Abd and Pelvis   06/27/2016 NM GASTRIC EMPTYING  6/12/16 CT Abd Pelvis      *3/5/2020 All images has been archived into PACS. -Bhao      3/4/2020 5:19PM sent a fax to [facility] for images - Amay     REFERRAL INFORMATION    Date referral was placed: 1/13/2020   Date all records received: N/A   Date records were scanned into Epic: N/A   Date records were sent to Provider to review: N/A   Date and recommendation received from provider:  LETTER SENT  SCHEDULE APPOINTMENT   Date patient was contacted to schedule: 3/4/2020

## 2020-04-13 ENCOUNTER — VIRTUAL VISIT (OUTPATIENT)
Dept: PULMONOLOGY | Facility: CLINIC | Age: 60
End: 2020-04-13
Attending: INTERNAL MEDICINE
Payer: COMMERCIAL

## 2020-04-13 DIAGNOSIS — J98.6 PARALYZED HEMIDIAPHRAGM: ICD-10-CM

## 2020-04-13 DIAGNOSIS — J98.4 SMALL AIRWAYS DISEASE: Primary | ICD-10-CM

## 2020-04-13 DIAGNOSIS — J84.9 ILD (INTERSTITIAL LUNG DISEASE) (H): ICD-10-CM

## 2020-04-13 DIAGNOSIS — J84.89 NSIP (NONSPECIFIC INTERSTITIAL PNEUMONIA) (H): ICD-10-CM

## 2020-04-13 DIAGNOSIS — R09.02 HYPOXIA: ICD-10-CM

## 2020-04-13 RX ORDER — AZITHROMYCIN 250 MG/1
250 TABLET, FILM COATED ORAL DAILY
Qty: 90 TABLET | Refills: 0 | Status: SHIPPED | OUTPATIENT
Start: 2020-04-13 | End: 2020-07-29

## 2020-04-13 RX ORDER — MONTELUKAST SODIUM 10 MG/1
10 TABLET ORAL EVERY EVENING
Qty: 90 TABLET | Refills: 0 | Status: SHIPPED | OUTPATIENT
Start: 2020-04-13 | End: 2020-07-29

## 2020-04-13 RX ORDER — ALBUTEROL SULFATE 0.83 MG/ML
2.5 SOLUTION RESPIRATORY (INHALATION) EVERY 6 HOURS PRN
Qty: 300 ML | Refills: 1 | Status: SHIPPED | OUTPATIENT
Start: 2020-04-13 | End: 2022-12-26

## 2020-04-13 NOTE — PATIENT INSTRUCTIONS
Continue Breo inhaler daily, albuterol inhaler as needed.    Added azithromycin daily and montelukast daily    Prescribed nebulizer and albuterol neb to be used as needed. Don't use albuterol inhaler and nebulizer more than 4 times daily.     Once COVID subsides, consider pulmonary rehab, and return for 3 month follow up with SNIFF diaphragm testing.     Continue to use oxygen!

## 2020-04-13 NOTE — PROGRESS NOTES
"Ciera Chong is a 59 year old female who is being evaluated via a billable telephone visit.      The patient has been notified of following:     \"This telephone visit will be conducted via a call between you and your physician/provider. We have found that certain health care needs can be provided without the need for a physical exam.  This service lets us provide the care you need with a short phone conversation.  If a prescription is necessary we can send it directly to your pharmacy.  If lab work is needed we can place an order for that and you can then stop by our lab to have the test done at a later time.    Telephone visits are billed at different rates depending on your insurance coverage. During this emergency period, for some insurers they may be billed the same as an in-person visit.  Please reach out to your insurance provider with any questions.    If during the course of the call the physician/provider feels a telephone visit is not appropriate, you will not be charged for this service.\"    Patient has given verbal consent for Telephone visit?  Yes    How would you like to obtain your AVS? Mail a copy    Additional provider notes:     [facility]  CC: ILD follow up.    HPI:   Ciera Chong is a 59 year old female with a history of seronegative RA vs psoriatic arthritis, positive FRED, breast cancer s/p bilateral mastectomy and chemotherapy (2006), anxiety, chronic pain on chronic opioids, gastric ulcers, LVH with diastolic CHF, and ILD, presenting to clinic today for follow up of NSIP and air trapping, as well as elevated diaphragm.     Brief summary (from prior visits):   Initially seen in January 2020 for dyspnea in the setting of seronegative RA. Previously treated with remicade, leflunomide, sulfasalazine, hydroxychloroquine, methotrexate, Otezla, Orencia, Cimzia, and prednisone. Currently on Cimzia and Prednisone for RA. Prior to " establishing care with our clinic, was seen by [facility], and given oxygen. Had previously used an inhaler with benefit as well. At our evaluation, was found to have minimal NSIP change with some air trapping (could be due to RA), but we determined that majority of dyspnea and restriction was likely from kasi-diaphragm elevation. She was started on ICS/LABA.    Interval history:   Overall, breathing feels about the same as in January. She did start her Breo inhaler. She hasn't noted a significant change in symptoms since starting this. She continues to get pretty short of breath, even just with activities around the house, although this is similar to prior. Her dyspnea can wax and wane on different days. She is wheezing occasionally. She continues to use albuterol, with mild benefit only. She denies chest pain or palpitations. She denies chills, but does have mild fevers daily, which have been present for about 1 year. She thinks this could be due to her teeth, but she hasn't been able to see a dentist due to COVID. She denies significant cough. She does have a lot of nasal congestion, and frequently has to blow her nose. She feels this is due to oxygen use. She is using oxygen about 15 hrs a day, 2 LPM with exertion, 4 LPM at night.     She does not have feather pillows or down comforters in the house, but does have mold in the basement.     Exposure History: reviewed and unchanged.   Tobacco: former use, quit 2000. 7.5 pack years (1/2 ppd for 15 years)  Other inhaled substances: No marijuana, no vaping. No asbestos, sandblasting, welding, TB, spray painting.   Occupation: Not currently working. 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   Pets: She has a dog at home. No birds.   Allergies: No environmental allergies.   Hobbies: No significant outdoor exposures.   Travel: None  Home: Lives in house with . There are some mold issues in the bathroom, which were abated in Nov  2019. No change in breathing since abatement. She is unsure if she uses a down comforter. No feather pillow use. No hot tub use, no musical instrument use.      Allergies: Reviewed in EHR    Current medications: Reviewed in EHR    Past Medical, surgical, social, and family histories: Personally reviewed during this visit and updated in EMR as appropriate.    ROS: Complete 10 point ROS negative unless mentioned in HPI    Labs and Radiology: I have reviewed the results with the patient today.   All laboratory data reviewed   2/7/20 [facility]  ESR 29, CRP 1.07 (high)  Cr 1.34    1/13/20   WBC 7.2, Hgb 13, plts 289.   LFTs normal  HP panel positive for micropolyspora faeni, thermoact vulgaris 1, saccharo viridis ab, and thermo candidus ab  FRED positive, 1:320 Homogeneous and speckled (1:160)  IgGs normal  ANCA negative  Anti Dory 1, Scl-70, SSB, SSA, anti CCP negative  Aldolase 7.4 (nl)  CK 67 (nl)  RF 21  CRP 10.2 ESR 31    All cardiac studies reviewed by me. No new studies    All imaging studies reviewed by me.   1/13/20 CT chest: 1. Decreased groundglass opacities throughout the lungs with mild residual mosaic attenuation and slight air trapping on expiratory images. Findings are nonspecific but may be seen with small airways disease such as asthma or bronchiolitis obliterans and with  interstitial lung disease such as hypersensitivity pneumonitis. 2. Hepatic steatosis. 3. Elevated right hemidiaphragm possibly diaphragmatic paralysis.    PFT's:  Pulmonary Function Testing: personally reviewed.   Date and Site FEV1 FVC FEV1/FVC TLC RV DLCO   8/17/17 [facility] 2.07 (70%) 2.43 (64%) 85% 4.13 (75%)   15.87 (58%)   11/20/18 [facility] 1.67 (57%) 1.94 (51%) 86% 3.51 (63%)   12.13 (45%)   1/13/20 [facility] 1.48 (56%) 1.76 (53%) 84% 4.21 (77%) 2.19 (108%) 15.07 (66%)   1/13/20 PFT interpretation: Moderately severe restriction present, not significantly worse than 2018 PFTs, with mild diffusion defect, improved from prior.       1/13/20 Six minute walk: Walk started on RA, but patient desaturated to 88%, placed on 2 LPM. Walk continued, patient had significant desaturation, but no hypoxia on 2 LPM (SpO2 dropped from 96 to 92% on 2 LPM, 88% on RA). Walk distance reduced, 265 m vs .     Assessment and Plan:  Ciera Chong is a 59 year old female with a history of seronegative RA vs psoriatic arthritis, positive FRED, breast cancer s/p bilateral mastectomy and chemotherapy (2006), anxiety, chronic pain on chronic opioids, gastric ulcers, LVH with diastolic CHF, and ILD, presenting to clinic today for follow up of NSIP and air trapping, as well as elevated diaphragm.     1. Minimal NSIP, air trapping in setting of Seronegative RA, moderately severe restrictive lung disease with elevated kasi-diaphragm: ILD conference discussion was minimal NSIP and air trapping present, with most of restriction more likely secondary to right diaphragmatic elevation. RA may be causing her air trapping, and after ILD conference discussion, she was changed to ICS/LABA. ILD panel showed low positive FRED, several positive results on HP panel, and RF of 21. Not noting much benefit from Breo alone.   -Continue current RA treatment, remains on cimzia and prednisone.   -Continue Breo, will add azithromycin and montelukast as well to see if there is benefit with 3 month trial. Counseled on potential side effects.   -continue PRN albuterol, gets benefit from this. Asking for albuterol neb, has felt she got more benefit from this when she has used in the past. Will order.   -Encouraged her to go to pulmonary rehab once COVID has passed  -supplemental O2 as below.   -up to date on both pneumonia vaccines (2016 and 2017)  -Annual flu vaccination (up to date)     2. Hypoxia with exertion: Needed 2 LPM with exertion at initial visit in Jan 2020. Already has this at home, as well as 4 LPM at night. Typically only uses nightly O2.  -Advised to use 2 LPM with  exertion throughout the day  -Uses [employer] for O2     3. 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 Likely contributing to restriction.   -Check SNIFF test (not yet done due to COVID19), at next in person visit.   -May need thoracic surgery referral, vs bipap/cpap therapy at least at night.      Follow up in 3 months with PFTs, sooner if needed.     Phone call duration: 21 minutes    Roge Farrell MD      DME (Durable Medical Equipment) Orders and Documentation  Orders Placed This Encounter   Procedures     Nebulizer and Supplies Order      The patient was assessed and it was determined the patient is in need of the following listed DME Supplies/Equipment. Please complete supporting documentation below to demonstrate medical necessity.      Nebulizer Documentation  The patient was seen 04/13/2020. After assessment, the patient will need to be treated with ongoing nebulizer for treatment/management of small airways disease.

## 2020-04-15 ENCOUNTER — PATIENT OUTREACH (OUTPATIENT)
Dept: GASTROENTEROLOGY | Facility: CLINIC | Age: 60
End: 2020-04-15

## 2020-04-29 ENCOUNTER — TELEPHONE (OUTPATIENT)
Dept: GASTROENTEROLOGY | Facility: CLINIC | Age: 60
End: 2020-04-29

## 2020-06-09 ENCOUNTER — TELEPHONE (OUTPATIENT)
Dept: GASTROENTEROLOGY | Facility: CLINIC | Age: 60
End: 2020-06-09

## 2020-06-11 ENCOUNTER — TELEPHONE (OUTPATIENT)
Dept: GASTROENTEROLOGY | Facility: CLINIC | Age: 60
End: 2020-06-11

## 2020-06-16 ENCOUNTER — PRE VISIT (OUTPATIENT)
Dept: GASTROENTEROLOGY | Facility: CLINIC | Age: 60
End: 2020-06-16

## 2020-06-16 ENCOUNTER — VIRTUAL VISIT (OUTPATIENT)
Dept: GASTROENTEROLOGY | Facility: CLINIC | Age: 60
End: 2020-06-16
Attending: INTERNAL MEDICINE
Payer: COMMERCIAL

## 2020-06-16 DIAGNOSIS — Z99.81 OXYGEN DEPENDENT: ICD-10-CM

## 2020-06-16 DIAGNOSIS — K21.9 GASTROESOPHAGEAL REFLUX DISEASE WITHOUT ESOPHAGITIS: Primary | ICD-10-CM

## 2020-06-16 DIAGNOSIS — F11.90 OPIATE USE: ICD-10-CM

## 2020-06-16 DIAGNOSIS — E66.812 CLASS 2 OBESITY DUE TO EXCESS CALORIES WITH BODY MASS INDEX (BMI) OF 38.0 TO 38.9 IN ADULT, UNSPECIFIED WHETHER SERIOUS COMORBIDITY PRESENT: ICD-10-CM

## 2020-06-16 DIAGNOSIS — J84.9 ILD (INTERSTITIAL LUNG DISEASE) (H): ICD-10-CM

## 2020-06-16 DIAGNOSIS — E66.09 CLASS 2 OBESITY DUE TO EXCESS CALORIES WITH BODY MASS INDEX (BMI) OF 38.0 TO 38.9 IN ADULT, UNSPECIFIED WHETHER SERIOUS COMORBIDITY PRESENT: ICD-10-CM

## 2020-06-16 DIAGNOSIS — K59.03 DRUG-INDUCED CONSTIPATION: ICD-10-CM

## 2020-06-16 ASSESSMENT — PAIN SCALES - GENERAL: PAINLEVEL: WORST PAIN (10)

## 2020-06-16 NOTE — PROGRESS NOTES
"Ciera Chong is a 59 year old female who is being evaluated via a billable telephone visit.      The patient has been notified of following:     \"This telephone visit will be conducted via a call between you and your physician/provider. We have found that certain health care needs can be provided without the need for a physical exam.  This service lets us provide the care you need with a short phone conversation.  If a prescription is necessary we can send it directly to your pharmacy.  If lab work is needed we can place an order for that and you can then stop by our lab to have the test done at a later time.    Telephone visits are billed at different rates depending on your insurance coverage. During this emergency period, for some insurers they may be billed the same as an in-person visit.  Please reach out to your insurance provider with any questions.    If during the course of the call the physician/provider feels a telephone visit is not appropriate, you will not be charged for this service.\"    Patient has given verbal consent for Telephone visit?  Yes    What phone number would you like to be contacted at? [phone]    How would you like to obtain your AVS? Mail a copy    Phone call duration: 54 minutes    Johanna Jerry PA-C      "

## 2020-06-16 NOTE — NURSING NOTE
Chief Complaint   Patient presents with     Consult     new consult       There were no vitals filed for this visit.    There is no height or weight on file to calculate BMI.    Richelle Velasco, CMA

## 2020-06-16 NOTE — PROGRESS NOTES
"GI CLINIC VISIT - NEW PATIENT    CC/REFERRING PROVIDER: Roge Farrell  REASON FOR CONSULTATION: GERD, constipation    HPI: 59 year old female with PMH of seronegative RA, breast cancer s/p bilateral mastectomy and chemotherapy (2006), chronic pain on chronic opiods, gastric ulcer, LVH with diastolic CHF, ILD (minimal NSIP) presenting to GI clinic for \"esophageal burning\" and abdominal pain.    She describes having acid reflux for 10 years, described as burning from throat to umbilicus. She was prescribed Nexium at that time, and it worked well. About 5 years ago, the burning worsened, and she believes her Nexum was increased to 40 mg BID at that time. Burning is constant, wakes up with it, never goes away. It sometimes awakens her at night. Last few weeks, has noticed intermittent reflux to the throat, feels sour, a few times per week. This has not been typical of her heartburn in the past. Doesn't worsen with eating. Spicy food makes it worse. Taking carafate, helps a little bit. Currently on Nexium 40 mg twice daily. Tums help a little bit, but completely resolve the better. Was on Zantac, but stopped it about 4 months ago due to recent concerns. Sometimes feels nauseated, has had emesis twice, last episode a week ago. Wasn't feeling good at the time, feels fine since.  No dysphagia outside of isolated episodes with drinking pop, felt like she had to do \"hard swallows\". Thinks she has gained weight, but hasn't weighed herself. No excessive belching.    She has lower abdominal pain with bowel movements, and sensation of incomplete evacuation. Pain is located in lower abdomen generally. This has been going on for 5 years. Currently on Miralax one capful daily. Has never tried any other dosing. BM most days, with 2 days the longest without a bowel movement. +Straining with most stools even if soft. No bristol 1-2 stools. Normally a \"mushy\" consistency. No blood, black stools. Pain is before and during the bowel " movement, feels like cramp, improves after the bowel movement. Feels bloated all the time.     History of an ulcer, feels worse this time. She doesn't recall when this was, but knows it was before cancer treatment. She recalls no known inciting factors. On chronic opiates.    Bad dry mouth. Gets thrush 2-3 times per year.     Goal - wants her life back. The burning is constant. Never goes away. Wants to find out what is causing.    Has been following with GI at [facility], transferred here because she felt like her providers kept leaving. Work-up has included EGD in 2018 which was unremarkable, with exception of food residue. Gastric emptying study in 2016 was normal. Manometry and PH testing in 2016 showing hypotensive LES (5.1 mmHg), most swallows were peristatic. PH study in 2016 showing increased reflux despite aggressive medical therapy at the time, predominantly in recumbant pisiotn. Correlated symptoms 65%. RUQ in 2017 showed hepatic steatosis. CT CAP in 7/2017 showing diverticulosis.     ROS: No fevers, chills, chest pain, headaches, acute vision changes, new pain, bleeding, skin problems, no history of Reynaud's.   + tingling in feet (chronic)  +MERAZ (chronic per pt)  +headaches occasional (chronic)   10pt ROS performed and otherwise negative.    PAST MEDICAL HISTORY:  Past Medical History:   Diagnosis Date     Anxiety      Breast cancer (H)      Chronic pain      Diastolic CHF (H)      Gastric ulcer      ILD (interstitial lung disease) (H)      LVH (left ventricular hypertrophy)      Seronegative rheumatoid arthritis (H)        PREVIOUS ABDOMINAL/GYNECOLOGIC SURGERIES:  Hysterectomy (open)  Fat grafts for mastectomy   Past Surgical History:   Procedure Laterality Date     AS REVISE BREAST RECONSTRUCTION       MASTECTOMY, BILATERAL       ROTATOR CUFF REPAIR RT/LT       SPINE SURGERY           PERTINENT MEDICATIONS:  Current Outpatient Medications   Medication Sig Dispense Refill     albuterol (PROAIR  HFA/PROVENTIL HFA/VENTOLIN HFA) 108 (90 Base) MCG/ACT inhaler Inhale 1-2 puffs into the lungs       albuterol (PROVENTIL) (2.5 MG/3ML) 0.083% neb solution Take 1 vial (2.5 mg) by nebulization every 6 hours as needed for shortness of breath / dyspnea or wheezing 300 mL 1     amLODIPine (NORVASC) 5 MG tablet Take 5 mg by mouth       azithromycin (ZITHROMAX) 250 MG tablet Take 1 tablet (250 mg) by mouth daily 90 tablet 0     buPROPion (WELLBUTRIN SR) 200 MG 12 hr tablet Take 200 mg by mouth       cetirizine (ZYRTEC) 10 MG tablet Take 10 mg by mouth       cyclobenzaprine (FLEXERIL) 10 MG tablet Take 10 mg by mouth       diclofenac (VOLTAREN) 1 % topical gel        DULoxetine (CYMBALTA) 30 MG capsule Take 30 mg by mouth       DULoxetine (CYMBALTA) 60 MG capsule Take 60 mg by mouth       esomeprazole (NEXIUM) 40 MG DR capsule TAKE 1 CAPSULE BY MOUTH TWICE DAILY       fluticasone (FLONASE) 50 MCG/ACT nasal spray Spray 2 sprays in nostril       fluticasone (FLOVENT HFA) 220 MCG/ACT inhaler Inhale 1 puff into the lungs 2 times daily 12 g 3     fluticasone-salmeterol (ADVAIR HFA) 230-21 MCG/ACT IN inhaler Inhale 2 puffs into the lungs 2 times daily 1 Inhaler 11     melatonin 5 MG tablet Take 5-15 mg by mouth       mirtazapine (REMERON) 7.5 MG tablet Take 7.5 mg by mouth       montelukast (SINGULAIR) 10 MG tablet Take 1 tablet (10 mg) by mouth every evening 90 tablet 0     Multiple Vitamin (THERA-TABS) TABS Take 1 tablet by mouth       oxyCODONE (OXYCONTIN) 20 MG 12 hr tablet Take 20 mg by mouth       oxyCODONE-acetaminophen (PERCOCET) 5-325 MG tablet Take 1-2 tablets by mouth       polyethylene glycol (MIRALAX/GLYCOLAX) powder Take 17 g by mouth       pravastatin (PRAVACHOL) 20 MG tablet Take 20 mg by mouth       predniSONE (DELTASONE) 1 MG tablet Take 5 mg by mouth       pregabalin (LYRICA) 150 MG capsule TAKE 1 CAPSULE BY MOUTH THREE TIMES DAILY       propranolol (INDERAL) 40 MG tablet Take 40 mg by mouth       sucralfate  (CARAFATE) 1 GM/10ML suspension TAKE 2 TEASPOONSFUL (10ML) BY MOUTH 4 TIMES DAILY.       SUMAtriptan (IMITREX) 50 MG tablet Take 50 mg by mouth       zolpidem ER (AMBIEN CR) 6.25 MG CR tablet TAKE 1 TABLET BY MOUTH ONCE DAILY AT BEDTIME AS NEEDED FOR SLEEP       No other NSAIDs reported by patient.  No other OTC/herbal/supplements reported by patient.    SOCIAL HISTORY:  Smoking: no  EtOH: no  No other drug use    Has birthed two children, vaginal births  Social History     Socioeconomic History     Marital status: Single     Spouse name: Not on file     Number of children: Not on file     Years of education: Not on file     Highest education level: Not on file   Occupational History     Not on file   Social Needs     Financial resource strain: Not on file     Food insecurity     Worry: Not on file     Inability: Not on file     Transportation needs     Medical: Not on file     Non-medical: Not on file   Tobacco Use     Smoking status: Former Smoker     Packs/day: 0.50     Years: 15.00     Pack years: 7.50     Last attempt to quit: 2000     Years since quittin.4     Smokeless tobacco: Never Used   Substance and Sexual Activity     Alcohol use: Not Currently     Drug use: Not Currently     Sexual activity: Not on file   Lifestyle     Physical activity     Days per week: Not on file     Minutes per session: Not on file     Stress: Not on file   Relationships     Social connections     Talks on phone: Not on file     Gets together: Not on file     Attends Congregational service: Not on file     Active member of club or organization: Not on file     Attends meetings of clubs or organizations: Not on file     Relationship status: Not on file     Intimate partner violence     Fear of current or ex partner: Not on file     Emotionally abused: Not on file     Physically abused: Not on file     Forced sexual activity: Not on file   Other Topics Concern     Parent/sibling w/ CABG, MI or angioplasty before 65F 55M? Not  "Asked   Social History Narrative     Not on file       FAMILY HISTORY:  Father had esophageal cancer, passed away, age 70 at dx  Sister had breast cancer at age 56, metastatic, passed away    No colon/panc/esophageal/other GI CA, no other Reardon or other HPS-related Ashlie. No IBD/celiac, no other AI/liver/thyroid disease.  Family History   Problem Relation Age of Onset     Esophageal Cancer Father      Heart Disease Father      Heart Disease Brother      Breast Cancer Sister      Breast Cancer Maternal Aunt         x2 aunts     Ovarian Cancer Maternal Aunt        PHYSICAL EXAMINATION:  (telephone visit)    PERTINENT STUDIES Reviewed in EMR  Colonoscopy 3/1/2019: diverticulosis in the recto-sigmoid colon and in the sigmoid colon, otherwise normal colon and terminal ileum  EGD 5/26/2018: food in stomach, otherwise normal  CBC 2/7/20 unremkarble , ESR 29 (chronic elevation)  TSH 7/2019 normal  Gastric emptying study reportedly normal 6/2016    ASSESSMENT/PLAN:  59 year old female with PMH of seronegative RA on prednisone, breast cancer s/p bilateral mastectomy and chemotherapy (2006), chronic pain on chronic opiods, GERD, gastric ulcer, LVH with diastolic CHF, ILD (minimal NSIP, on chronic oxygen) presenting to GI clinic for \"esophageal burning\" and abdominal pain.    # GERD: Chronic history of GERD, described as substernal burning, for at least 10 years, with worsening 5 years ago, with worsening over time on Nexium 40 mg BID.. Several week history of increased reflux symptoms, not typical of her heartburn in the past. EGD in 2018 was unremkarble with exception of food resdue. Previous gastric emptying study in 2016 was normal. Manomtry/pH testing in 2016 showed hypotensive LES, and refractory GERD despite aggressive management, respectively. Differential includes refractory GERD, constipation, candidal esophagitis, chronic opiates, EGJOO (though manometry results in 2016 showed a hypotensive LES).  - Plan on repeating " EGD, if negative, consider repeating manometry/pH study. May need to consider discussion of decreasing opiates pending results of testing.  - In the meantime, can trial adding in Pepcid, Gaviscon  - Reviewed lifestyle modifications, including weight loss    # Constipation: chronic for several years, likely multifactorial in setting opiates, possible pelvic floor dysfunction. Colonoscopy was unremarkable in 2019, with exception of diverticulosis.  TSH 2019 normal.  - Increase Miralax to BID. If stools become loose, she may try fiber supplementation with psyllium in addition to Miralax.  - If not helpful, could consider Linzess.  - Can also consider pelvic floor PT in the future    RTC 3 months    Thank you for this consultation. It was a pleasure to participate in the care of this patient; please contact us with any further questions.    Johanna Jerry PA-C

## 2020-06-16 NOTE — PATIENT INSTRUCTIONS
It was a pleasure taking care of you today.  I've included a brief summary of our discussion and care plan from today's visit below.  Please review this information with your primary care provider.  _______________________________________________________________________    My recommendations are summarized as follows:    For the burning:  - Plan for upper endoscopy in the next month or so. Somebody should be calling you to help get this scheduled.  - In the meantime, OK to use Pepcid (famotidine) as needed in addition to the Nexium. This is a sister drug to the Zantac and considered safe.    Lifestyle modifications for gastroesophageal reflux disease (GERD).     1. Change your eating habits.  -- It's best to eat several small meals instead of two or three large meals.  -- After you eat, wait 2 to 3 hours before you lie down. Late-night snacks aren't a good idea.  -- Chocolate, mint, and alcohol can make GERD worse. They relax the valve between the esophagus and the stomach.  -- Spicy foods, fatty foods, foods that have a lot of acid (like tomatoes and oranges), and coffee can make GERD symptoms worse in some people. If your symptoms are worse after you eat a certain food, you may want to stop eating that food to see if your symptoms get better.    2. Do not smoke or chew tobacco.    3. If you have GERD symptoms at night, raise the head of your bed 6 in. (15 cm) to 8 in. (20 cm) by putting the frame on blocks or placing a foam wedge under the head of your mattress. (Adding extra pillows does not work.)    4. Avoid or reduce pressure on your stomach. Don't wear tight clothing around your middle.    5. Lose weight if you need to. Losing just 5 to 10 pounds can help.    For the constipation:  - Increase Miralax to one capful twice daily  - Try to stay hydrated with at least 50-60 oz fluids daily  - If your stools become more runny, you could try adding in a fiber supplement, such as Metamucil    Please call our nurse  Rissa with any questions or concerns- [phone].  --    Return to GI Clinic in 3 months to review your progress.    _______________________________________________________________________    Who do I call with any questions after my visit?  Please be in touch if there are any further questions that arise following today's visit.  There are multiple ways to contact your gastroenterology care team.        During business hours, you may reach a Gastroenterology nurse at [phone] and choose option 3.         To schedule or reschedule an appointment, please call [phone].       You can always send a secure message through [employer].  [employer] messages are answered by your nurse or doctor typically within 24 hours.  Please allow extra time on weekends and holidays.        For urgent/emergent questions after business hours, you may reach the on-call GI Fellow by contacting the [facility]  at [phone].     How will I get the results of any tests ordered?    You will receive all of your results.  If you have signed up for [employer], any tests ordered at your visit will be available to you after your physician reviews them.  Typically this takes 1-2 weeks.  If there are urgent results that require a change in your care plan, your physician or nurse will call you to discuss the next steps.      What is [employer]?  [employer] is a secure way for you to access all of your healthcare records from the [facility].  It is a web based computer program, so you can sign on to it from any location.  It also allows you to send secure messages to your care team.  I recommend signing up for [employer] access if you have not already done so and are comfortable with using a computer.      How to I schedule a follow-up visit?  If you did not schedule a follow-up visit today, please call [phone] to schedule a follow-up office visit.        Sincerely,    Johanna Jerry PA-C    [facility]  [place]  Division of Gastroenterology

## 2020-06-16 NOTE — LETTER
"    6/16/2020         RE: Ciera Chong  [address]  [place] [address]        Dear Colleague,    Thank you for referring your patient, Ciera Chong, to the [facility]. Please see a copy of my visit note below.    GI CLINIC VISIT - NEW PATIENT    CC/REFERRING PROVIDER: Roge Farrell  REASON FOR CONSULTATION: GERD, constipation    HPI: 59 year old female with PMH of seronegative RA, breast cancer s/p bilateral mastectomy and chemotherapy (2006), chronic pain on chronic opiods, gastric ulcer, LVH with diastolic CHF, ILD (minimal NSIP) presenting to GI clinic for \"esophageal burning\" and abdominal pain.    She describes having acid reflux for 10 years, described as burning from throat to umbilicus. She was prescribed Nexium at that time, and it worked well. About 5 years ago, the burning worsened, and she believes her Nexum was increased to 40 mg BID at that time. Burning is constant, wakes up with it, never goes away. It sometimes awakens her at night. Last few weeks, has noticed intermittent reflux to the throat, feels sour, a few times per week. This has not been typical of her heartburn in the past. Doesn't worsen with eating. Spicy food makes it worse. Taking carafate, helps a little bit. Currently on Nexium 40 mg twice daily. Tums help a little bit, but completely resolve the better. Was on Zantac, but stopped it about 4 months ago due to recent concerns. Sometimes feels nauseated, has had emesis twice, last episode a week ago. Wasn't feeling good at the time, feels fine since.  No dysphagia outside of isolated episodes with drinking pop, felt like she had to do \"hard swallows\". Thinks she has gained weight, but hasn't weighed herself. No excessive belching.    She has lower abdominal pain with bowel movements, and sensation of incomplete evacuation. Pain is located in lower abdomen generally. This has been going on for 5 years. Currently on Miralax one capful " "daily.   Has never tried any other dosing. BM most days, with 2 days the longest without a bowel movement. +Straining with most stools even if soft. No bristol 1-2 stools. Normally a \"mushy\" consistency. No blood, black stools. Pain is before and during the bowel movement, feels like cramp, improves after the bowel movement. Feels bloated all the time.     History of an ulcer, feels worse this time. She doesn't recall when this was, but knows it was before cancer treatment. She recalls no known inciting factors. On chronic opiates.    Bad dry mouth. Gets thrush 2-3 times per year.     Goal - wants her life back. The burning is constant. Never goes away. Wants to find out what is causing.    Has been following with GI at [facility], transferred here because she felt like her providers kept leaving. Work-up has included EGD in 2018 which was unremarkable, with exception of food residue. Gastric emptying study in 2016 was normal. Manometry and PH testing in 2016 showing hypotensive LES (5.1 mmHg), most swallows were peristatic. PH study in 2016 showing increased reflux despite aggressive medical therapy at the time, predominantly in recumbant pisiotn. Correlated symptoms 65%. RUQ in 2017 showed hepatic steatosis. CT CAP in 7/2017 showing diverticulosis.     ROS: No fevers, chills, chest pain, headaches, acute vision changes, new pain, bleeding, skin problems, no history of Reynaud's.   + tingling in feet (chronic)  +MERAZ (chronic per pt)  +headaches occasional (chronic)   10pt ROS performed and otherwise negative.    PAST MEDICAL HISTORY:  Past Medical History:   Diagnosis Date     Anxiety      Breast cancer (H)      Chronic pain      Diastolic CHF (H)      Gastric ulcer      ILD (interstitial lung disease) (H)      LVH (left ventricular hypertrophy)      Seronegative rheumatoid arthritis (H)        PREVIOUS ABDOMINAL/GYNECOLOGIC SURGERIES:  Hysterectomy (open)  Fat grafts for mastectomy   Past Surgical History: "   Procedure Laterality Date     AS REVISE BREAST RECONSTRUCTION       MASTECTOMY, BILATERAL       ROTATOR CUFF REPAIR RT/LT       SPINE SURGERY           PERTINENT MEDICATIONS:  Current Outpatient Medications   Medication Sig Dispense Refill     albuterol (PROAIR HFA/PROVENTIL HFA/VENTOLIN HFA) 108 (90 Base) MCG/ACT inhaler Inhale 1-2 puffs into the lungs       albuterol (PROVENTIL) (2.5 MG/3ML) 0.083% neb solution Take 1 vial (2.5 mg) by nebulization every 6 hours as needed for shortness of breath / dyspnea or wheezing 300 mL 1     amLODIPine (NORVASC) 5 MG tablet Take 5 mg by mouth       azithromycin (ZITHROMAX) 250 MG tablet Take 1 tablet (250 mg) by mouth daily 90 tablet 0     buPROPion (WELLBUTRIN SR) 200 MG 12 hr tablet Take 200 mg by mouth       cetirizine (ZYRTEC) 10 MG tablet Take 10 mg by mouth       cyclobenzaprine (FLEXERIL) 10 MG tablet Take 10 mg by mouth       diclofenac (VOLTAREN) 1 % topical gel        DULoxetine (CYMBALTA) 30 MG capsule Take 30 mg by mouth       DULoxetine (CYMBALTA) 60 MG capsule Take 60 mg by mouth       esomeprazole (NEXIUM) 40 MG DR capsule TAKE 1 CAPSULE BY MOUTH TWICE DAILY       fluticasone (FLONASE) 50 MCG/ACT nasal spray Spray 2 sprays in nostril       fluticasone (FLOVENT HFA) 220 MCG/ACT inhaler Inhale 1 puff into the lungs 2 times daily 12 g 3     fluticasone-salmeterol (ADVAIR HFA) 230-21 MCG/ACT IN inhaler Inhale 2 puffs into the lungs 2 times daily 1 Inhaler 11     melatonin 5 MG tablet Take 5-15 mg by mouth       mirtazapine (REMERON) 7.5 MG tablet Take 7.5 mg by mouth       montelukast (SINGULAIR) 10 MG tablet Take 1 tablet (10 mg) by mouth every evening 90 tablet 0     Multiple Vitamin (THERA-TABS) TABS Take 1 tablet by mouth       oxyCODONE (OXYCONTIN) 20 MG 12 hr tablet Take 20 mg by mouth       oxyCODONE-acetaminophen (PERCOCET) 5-325 MG tablet Take 1-2 tablets by mouth       polyethylene glycol (MIRALAX/GLYCOLAX) powder Take 17 g by mouth       pravastatin  (PRAVACHOL) 20 MG tablet Take 20 mg by mouth       predniSONE (DELTASONE) 1 MG tablet Take 5 mg by mouth       pregabalin (LYRICA) 150 MG capsule TAKE 1 CAPSULE BY MOUTH THREE TIMES DAILY       propranolol (INDERAL) 40 MG tablet Take 40 mg by mouth       sucralfate (CARAFATE) 1 GM/10ML suspension TAKE 2 TEASPOONSFUL (10ML) BY MOUTH 4 TIMES DAILY.       SUMAtriptan (IMITREX) 50 MG tablet Take 50 mg by mouth       zolpidem ER (AMBIEN CR) 6.25 MG CR tablet TAKE 1 TABLET BY MOUTH ONCE DAILY AT BEDTIME AS NEEDED FOR SLEEP       No other NSAIDs reported by patient.  No other OTC/herbal/supplements reported by patient.    SOCIAL HISTORY:  Smoking: no  EtOH: no  No other drug use    Has birthed two children, vaginal births  Social History     Socioeconomic History     Marital status: Single     Spouse name: Not on file     Number of children: Not on file     Years of education: Not on file     Highest education level: Not on file   Occupational History     Not on file   Social Needs     Financial resource strain: Not on file     Food insecurity     Worry: Not on file     Inability: Not on file     Transportation needs     Medical: Not on file     Non-medical: Not on file   Tobacco Use     Smoking status: Former Smoker     Packs/day: 0.50     Years: 15.00     Pack years: 7.50     Last attempt to quit: 2000     Years since quittin.4     Smokeless tobacco: Never Used   Substance and Sexual Activity     Alcohol use: Not Currently     Drug use: Not Currently     Sexual activity: Not on file   Lifestyle     Physical activity     Days per week: Not on file     Minutes per session: Not on file     Stress: Not on file   Relationships     Social connections     Talks on phone: Not on file     Gets together: Not on file     Attends Jain service: Not on file     Active member of club or organization: Not on file     Attends meetings of clubs or organizations: Not on file     Relationship status: Not on file     Intimate  "partner violence     Fear of current or ex partner: Not on file     Emotionally abused: Not on file     Physically abused: Not on file     Forced sexual activity: Not on file   Other Topics Concern     Parent/sibling w/ CABG, MI or angioplasty before 65F 55M? Not Asked   Social History Narrative     Not on file       FAMILY HISTORY:  Father had esophageal cancer, passed away, age 70 at dx  Sister had breast cancer at age 56, metastatic, passed away    No colon/panc/esophageal/other GI CA, no other Reardon or other HPS-related Ashlie. No IBD/celiac, no other AI/liver/thyroid disease.  Family History   Problem Relation Age of Onset     Esophageal Cancer Father      Heart Disease Father      Heart Disease Brother      Breast Cancer Sister      Breast Cancer Maternal Aunt         x2 aunts     Ovarian Cancer Maternal Aunt        PHYSICAL EXAMINATION:  (telephone visit)    PERTINENT STUDIES Reviewed in EMR  Colonoscopy 3/1/2019: diverticulosis in the recto-sigmoid colon and in the sigmoid colon, otherwise normal colon and terminal ileum  EGD 5/26/2018: food in stomach, otherwise normal  CBC 2/7/20 unremkarble , ESR 29 (chronic elevation)  TSH 7/2019 normal  Gastric emptying study reportedly normal 6/2016    ASSESSMENT/PLAN:  59 year old female with PMH of seronegative RA on prednisone, breast cancer s/p bilateral mastectomy and chemotherapy (2006), chronic pain on chronic opiods, GERD, gastric ulcer, LVH with diastolic CHF, ILD (minimal NSIP, on chronic oxygen) presenting to GI clinic for \"esophageal burning\" and abdominal pain.    # GERD: Chronic history of GERD, described as substernal burning, for at least 10 years, with worsening 5 years ago, with worsening over time on Nexium 40 mg BID.. Several week history of increased reflux symptoms, not typical of her heartburn in the past. EGD in 2018 was unremkarble with exception of food resdue. Previous gastric emptying study in 2016 was normal. Manomtry/pH testing in 2016 showed " "hypotensive LES, and refractory GERD despite aggressive management, respectively. Differential includes refractory GERD, constipation, candidal esophagitis, chronic opiates, EGJOO (though manometry results in 2016 showed a hypotensive LES).  - Plan on repeating EGD, if negative, consider repeating manometry/pH study. May need to consider discussion of decreasing opiates pending results of testing.  - In the meantime, can trial adding in Pepcid, Gaviscon  - Reviewed lifestyle modifications, including weight loss    # Constipation: chronic for several years, likely multifactorial in setting opiates, possible pelvic floor dysfunction. Colonoscopy was unremarkable in 2019, with exception of diverticulosis.  TSH 2019 normal.  - Increase Miralax to BID. If stools become loose, she may try fiber supplementation with psyllium in addition to Miralax.  - If not helpful, could consider Linzess.  - Can also consider pelvic floor PT in the future    RTC 3 months    Thank you for this consultation. It was a pleasure to participate in the care of this patient; please contact us with any further questions.    Johanna Jerry PA-C          Ciera Chong is a 59 year old female who is being evaluated via a billable telephone visit.      The patient has been notified of following:     \"This telephone visit will be conducted via a call between you and your physician/provider. We have found that certain health care needs can be provided without the need for a physical exam.  This service lets us provide the care you need with a short phone conversation.  If a prescription is necessary we can send it directly to your pharmacy.  If lab work is needed we can place an order for that and you can then stop by our lab to have the test done at a later time.    Telephone visits are billed at different rates depending on your insurance coverage. During this emergency period, for some insurers they may be billed the same as an in-person visit.  " "Please reach out to your insurance provider with any questions.    If during the course of the call the physician/provider feels a telephone visit is not appropriate, you will not be charged for this service.\"    Patient has given verbal consent for Telephone visit?  Yes    What phone number would you like to be contacted at? [phone]    How would you like to obtain your AVS? Mail a copy    Phone call duration: 54 minutes    Johanna Jerry PA-C    Again, thank you for allowing me to participate in the care of your patient.      Sincerely,    Alexander Mendoza MD    "

## 2020-07-10 ENCOUNTER — HOSPITAL ENCOUNTER (OUTPATIENT)
Facility: CLINIC | Age: 60
End: 2020-07-10
Attending: INTERNAL MEDICINE | Admitting: INTERNAL MEDICINE
Payer: COMMERCIAL

## 2020-07-10 DIAGNOSIS — Z11.59 ENCOUNTER FOR SCREENING FOR OTHER VIRAL DISEASES: Primary | ICD-10-CM

## 2020-07-21 NOTE — PROGRESS NOTES
[facility]- Interstitial Lung Disease Clinic Follow Up    Assessment and Plan:  Ciera Chong is a 60 year old female with a history of seronegative RA vs psoriatic arthritis, positive FRED, breast cancer s/p bilateral mastectomy and chemotherapy (2006), anxiety, chronic pain on chronic opioids, gastric ulcers, LVH with diastolic CHF, and ILD, presenting to clinic today for follow up of NSIP and air trapping, as well as elevated diaphragm.      1. 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 RA may be causing her air trapping, and after ILD conference discussion, she was changed to ICS/LABA. ILD panel showed low positive FRED, several positive results on HP panel, and RF of 21.   -Continue current RA treatment, remains on cimzia and prednisone.   -Continue Breo, azithromycin and montelukast    -continue PRN albuterol, gets benefit from this. Asking for albuterol neb, has felt she got more benefit from this when she has used in the past. Will order neb machine.   -Encouraged her to go to pulmonary rehab once COVID has passed. Wants to think about this now.   -supplemental O2 as below.   -up to date on both pneumonia vaccines (2016 and 2017)  -Annual flu vaccination (up to date)  -Check SNIFF test (not yet done due to COVID19), ordered.   -May need thoracic surgery referral, vs bipap/cpap therapy at least at night, if SNIFF positive.      2. Hypoxia with exertion: Needed 2 LPM with exertion at initial visit in Jan 2020. Already has this at home, as well as 4 LPM at night. Typically only uses nightly O2.  -Advised to use 2 LPM with exertion throughout the day  -Uses [facility] for O2    3. Abscess/furuncle: on back.  -recommend urgent care or  PCP visit to drain.      Follow up in 3 months with PFTs, sooner if needed.     I spent 30 minutes on direct patient care today, with >50% of time in counseling/coordination of care.     Roge Farrell MD   of Medicine  Division of Pulmonary, Critical Care, Allergy, and Sleep Medicine  Pager [phone]  ______________________________________________________________  CC: ILD follow up    HPI:   Ciera Chong is a 60 year old female with a history of seronegative RA vs psoriatic arthritis, positive FRED, breast cancer s/p bilateral mastectomy and chemotherapy (2006), anxiety, chronic pain on chronic opioids, gastric ulcers, LVH with diastolic CHF, and ILD, presenting to clinic today for follow up of NSIP and air trapping, as well as elevated diaphragm.      Brief summary (from prior visits):   Initially seen in January 2020 for dyspnea in the setting of seronegative RA. Previously treated with remicade, leflunomide, sulfasalazine, hydroxychloroquine, methotrexate, Otezla, Orencia, Cimzia, and prednisone. Currently on Cimzia and Prednisone for RA. Prior to establishing care with our clinic, was seen by [facility], and given oxygen. Had previously used an inhaler with benefit as well. At our evaluation, was found to have minimal NSIP change with some air trapping (could be due to RA), but we determined that majority of dyspnea and restriction was likely from kasi-diaphragm elevation. She was started on ICS/LABA. She was last seen in April 2020, at which time she had started Breo, and azithromycin and montelukast were added. She was using oxygen 2 LPM with exertion, and 4 LPM at night.      Interval history:   Overall, Ms. Chong does not feel she is improving. She remains short of breath. Dyspnea typically occurs with exertion, especially when moving around. She is not short of breath at rest. She can only walk about <100 feet on flat ground before having to stop, however back pain  contributes to this as well. She avoids stairs. She is not coughing. She does have some rhinorrhea and congestion, this is stable. She occasional sore throat. No chest pain, palpitations, or LE edema. She does have some chronic stomach pain. She denies nausea, vomiting. She does have heartburn, on nexium- which doesn't seem to control symptoms.     She continues to use the Breo inhaler daily, as well as montelukast and Singulair. She is not using her nebulizer, as she does not have a nebulizer machine- just the meds. She no longer feels her albuterol inhaler is making much of a difference for her. She continues to use oxygen, 2 LPM with exertion, and 4 LPM at night.     Since last visit, she has also developed a sore on her back. This is getting bigger. She has not been evaluated for this. She also has a sore on the back of her leg, and the back of her arm, these are circular and erythematous lesions that she gets from time to time. Her RA symptoms are relatively controlled at this point. She remains on the Cimzia and prednisone 5 mg daily. She has put her down comforter away and is not using it currently.      Exposure History: reviewed and unchanged.   Tobacco: former use, quit 2000. 7.5 pack years (1/2 ppd for 15 years)  Other inhaled substances: No marijuana, no vaping. No asbestos, sandblasting, welding, TB, spray painting.   Occupation: Not currently working. 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   Pets: She has a dog at home. No birds.   Allergies: No environmental allergies.   Hobbies: No significant outdoor exposures.   Travel: None  Home: Lives in house with . There are some mold issues in the bathroom, which were abated in Nov 2019. No change in breathing since abatement. She is unsure if she uses a down comforter - not using currently. No feather pillow use. No hot tub use, no musical instrument use.     Allergies: Reviewed in EHR    Current medications: Reviewed in  EHR    Histories: Personally reviewed during this visit.   Past Medical History:   Diagnosis Date     Anxiety      Breast cancer (H)      Chronic pain      Diastolic CHF (H)      Gastric ulcer      ILD (interstitial lung disease) (H)      LVH (left ventricular hypertrophy)      Seronegative rheumatoid arthritis (H)      Past Surgical History:   Procedure Laterality Date     AS REVISE BREAST RECONSTRUCTION       MASTECTOMY, BILATERAL       ROTATOR CUFF REPAIR RT/LT       SPINE SURGERY       Family History   Problem Relation Age of Onset     Esophageal Cancer Father      Heart Disease Father      Heart Disease Brother      Breast Cancer Sister      Breast Cancer Maternal Aunt         x2 aunts     Ovarian Cancer Maternal Aunt      Social History     Socioeconomic History     Marital status: Single     Spouse name: Not on file     Number of children: Not on file     Years of education: Not on file     Highest education level: Not on file   Occupational History     Not on file   Social Needs     Financial resource strain: Not on file     Food insecurity     Worry: Not on file     Inability: Not on file     Transportation needs     Medical: Not on file     Non-medical: Not on file   Tobacco Use     Smoking status: Former Smoker     Packs/day: 0.50     Years: 15.00     Pack years: 7.50     Last attempt to quit: 2000     Years since quittin.5     Smokeless tobacco: Never Used   Substance and Sexual Activity     Alcohol use: Not Currently     Drug use: Not Currently     Sexual activity: Not on file   Lifestyle     Physical activity     Days per week: Not on file     Minutes per session: Not on file     Stress: Not on file   Relationships     Social connections     Talks on phone: Not on file     Gets together: Not on file     Attends Adventism service: Not on file     Active member of club or organization: Not on file     Attends meetings of clubs or organizations: Not on file     Relationship status: Not on file      Intimate partner violence     Fear of current or ex partner: Not on file     Emotionally abused: Not on file     Physically abused: Not on file     Forced sexual activity: Not on file   Other Topics Concern     Parent/sibling w/ CABG, MI or angioplasty before 65F 55M? Not Asked   Social History Narrative     Not on file       ROS: Complete 12 point ROS negative unless mentioned in HPI    Physical Exam:  /76   Pulse 66   Temp 98.3  F (36.8  C) (Oral)   Resp 18   Wt 110.2 kg (243 lb)   SpO2 95%   BMI 38.06 kg/m      General: Sitting in wheel chair in NAD  HEENT: anicteric, mask in place   Neck: Trachea midline.   Lymphatic: no cervical or supraclavicular lymphadenopathy   Chest: Mild crackles at right base, otherwise, CTAB, no wheezing, rhonchi  Cardiac: RRR no murmurs  Abdomen: Soft, obese, non tender, active BS  Extremities: No LE Edema, moving all extremities. No digital clubbing.   Neuro: A&Ox3, no focal defects. Speech/language wnl.   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 Left back with erythematous and tender lesion with fluctuance present, about 1.5 cm in diameter.   Psych: normal affect.     Labs and Radiology: I have reviewed the results with the patient today.   All laboratory data reviewed   5/8/20: ESR 42, CRP 1.4. Cr 0.9     All cardiac studies reviewed by me.   No new cardiac studies    All imaging studies reviewed by me.   No new imaging studies.     PFT's:  Pulmonary Function Testing: personally reviewed.   Date and Site FEV1 FVC FEV1/FVC TLC RV DLCO   8/17/17 [facility] 2.07 (70%) 2.43 (64%) 85% 4.13 (75%)   15.87 (58%)   11/20/18 [facility] 1.67 (57%) 1.94 (51%) 86% 3.51 (63%)   12.13 (45%)   1/13/20 [facility] 1.48 (56%) 1.76 (53%) 84% 4.21 (77%) 2.19 (108%) 15.07 (66%)   Today's PFT interpretation: arrived late- PFTs were not completed as ordered today.      1/13/20 Six minute walk: Walk started on RA,  but patient desaturated to 88%, placed on 2 LPM. Walk continued, patient had significant desaturation, but no hypoxia on 2 LPM (SpO2 dropped from 96 to 92% on 2 LPM, 88% on RA). Walk distance reduced, 265 m vs .        DME (Durable Medical Equipment) Orders and Documentation  Orders Placed This Encounter   Procedures     Nebulizer and Supplies Order      The patient was assessed and it was determined the patient is in need of the following listed DME Supplies/Equipment. Please complete supporting documentation below to demonstrate medical necessity.      Nebulizer Documentation  The patient was seen 07/22/2020. After assessment, the patient will need to be treated with ongoing nebulizer for treatment/management of small airways disease related to RA.

## 2020-07-22 ENCOUNTER — OFFICE VISIT (OUTPATIENT)
Dept: PULMONOLOGY | Facility: CLINIC | Age: 60
End: 2020-07-22
Attending: INTERNAL MEDICINE
Payer: COMMERCIAL

## 2020-07-22 VITALS
BODY MASS INDEX: 38.06 KG/M2 | RESPIRATION RATE: 18 BRPM | DIASTOLIC BLOOD PRESSURE: 76 MMHG | WEIGHT: 243 LBS | SYSTOLIC BLOOD PRESSURE: 117 MMHG | OXYGEN SATURATION: 95 % | HEART RATE: 66 BPM | TEMPERATURE: 98.3 F

## 2020-07-22 DIAGNOSIS — J98.4 SMALL AIRWAYS DISEASE: Primary | ICD-10-CM

## 2020-07-22 DIAGNOSIS — J98.6 PARALYZED HEMIDIAPHRAGM: ICD-10-CM

## 2020-07-22 DIAGNOSIS — J84.9 ILD (INTERSTITIAL LUNG DISEASE) (H): ICD-10-CM

## 2020-07-22 DIAGNOSIS — J84.89 NSIP (NONSPECIFIC INTERSTITIAL PNEUMONIA) (H): ICD-10-CM

## 2020-07-22 PROCEDURE — G0463 HOSPITAL OUTPT CLINIC VISIT: HCPCS | Mod: ZF

## 2020-07-22 ASSESSMENT — PAIN SCALES - GENERAL: PAINLEVEL: NO PAIN (0)

## 2020-07-22 NOTE — LETTER
7/22/2020         RE: Ciera Chong  [address]  [facility] [address]        Dear Colleague,    Thank you for referring your patient, Ciera Chong, to the [facility]. Please see a copy of my visit note below.    [facility]- Interstitial Lung Disease Clinic Follow Up    Assessment and Plan:  Ciera Chong is a 60 year old female with a history of seronegative RA vs psoriatic arthritis, positive FRED, breast cancer s/p bilateral mastectomy and chemotherapy (2006), anxiety, chronic pain on chronic opioids, gastric ulcers, LVH with diastolic CHF, and ILD, presenting to clinic today for follow up of NSIP and air trapping, as well as elevated diaphragm.      1. 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 RA may be causing her air trapping, and after ILD conference discussion, she was changed to ICS/LABA. ILD panel showed low positive FRED, several positive results on HP panel, and RF of 21.   -Continue current RA treatment, remains on cimzia and prednisone.   -Continue Breo, azithromycin and montelukast    -continue PRN albuterol, gets benefit from this. Asking for albuterol neb, has felt she got more benefit from this when she has used in the past. Will order neb machine.   -Encouraged her to go to pulmonary rehab once COVID has passed. Wants to think about this now.   -supplemental O2 as below.   -up to date on both pneumonia vaccines (2016 and 2017)  -Annual flu vaccination (up to date)  -Check SNIFF test (not yet done due to COVID19), ordered.   -May need thoracic surgery referral, vs bipap/cpap therapy at least at night, if SNIFF positive.      2. Hypoxia with exertion: Needed 2 LPM with  exertion at initial visit in Jan 2020. Already has this at home, as well as 4 LPM at night. Typically only uses nightly O2.  -Advised to use 2 LPM with exertion throughout the day  -Uses [employer] for O2    3. Abscess/furuncle: on back.  -recommend urgent care or PCP visit to drain.      Follow up in 3 months with PFTs, sooner if needed.     I spent 30 minutes on direct patient care today, with >50% of time in counseling/coordination of care.     Roge Farrell MD   of Medicine  Division of Pulmonary, Critical Care, Allergy, and Sleep Medicine  Pager [phone]  ______________________________________________________________  CC: ILD follow up    HPI:   Ciera Chong is a 60 year old female with a history of seronegative RA vs psoriatic arthritis, positive FRED, breast cancer s/p bilateral mastectomy and chemotherapy (2006), anxiety, chronic pain on chronic opioids, gastric ulcers, LVH with diastolic CHF, and ILD, presenting to clinic today for follow up of NSIP and air trapping, as well as elevated diaphragm.      Brief summary (from prior visits):   Initially seen in January 2020 for dyspnea in the setting of seronegative RA. Previously treated with remicade, leflunomide, sulfasalazine, hydroxychloroquine, methotrexate, Otezla, Orencia, Cimzia, and prednisone. Currently on Cimzia and Prednisone for RA. Prior to establishing care with our clinic, was seen by [facility], and given oxygen. Had previously used an inhaler with benefit as well. At our evaluation, was found to have minimal NSIP change with some air trapping (could be due to RA), but we determined that majority of dyspnea and restriction was likely from kasi-diaphragm elevation. She was started on ICS/LABA. She was last seen in April 2020, at which time she had started Breo, and azithromycin and montelukast were added. She was using oxygen 2 LPM with exertion, and 4 LPM at night.      Interval history:   Overall, Ms.  Arlette does not feel she is improving. She remains short of breath. Dyspnea typically occurs with exertion, especially when moving around. She is not short of breath at rest. She can only walk about <100 feet on flat ground before having to stop, however back pain contributes to this as well. She avoids stairs. She is not coughing. She does have some rhinorrhea and congestion, this is stable. She occasional sore throat. No chest pain, palpitations, or LE edema. She does have some chronic stomach pain. She denies nausea, vomiting. She does have heartburn, on nexium- which doesn't seem to control symptoms.     She continues to use the Breo inhaler daily, as well as montelukast and Singulair. She is not using her nebulizer, as she does not have a nebulizer machine- just the meds. She no longer feels her albuterol inhaler is making much of a difference for her. She continues to use oxygen, 2 LPM with exertion, and 4 LPM at night.     Since last visit, she has also developed a sore on her back. This is getting bigger. She has not been evaluated for this. She also has a sore on the back of her leg, and the back of her arm, these are circular and erythematous lesions that she gets from time to time. Her RA symptoms are relatively controlled at this point. She remains on the Cimzia and prednisone 5 mg daily. She has put her down comforter away and is not using it currently.      Exposure History: reviewed and unchanged.   Tobacco: former use, quit 2000. 7.5 pack years (1/2 ppd for 15 years)  Other inhaled substances: No marijuana, no vaping. No asbestos, sandblasting, welding, TB, spray painting.   Occupation: Not currently working. 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   Pets: She has a dog at home. No birds.   Allergies: No environmental allergies.   Hobbies: No significant outdoor exposures.   Travel: None  Home: Lives in house with . There are some mold issues in the bathroom,  which were abated in 2019. No change in breathing since abatement. She is unsure if she uses a down comforter - not using currently. No feather pillow use. No hot tub use, no musical instrument use.     Allergies: Reviewed in EHR    Current medications: Reviewed in EHR    Histories: Personally reviewed during this visit.   Past Medical History:   Diagnosis Date     Anxiety      Breast cancer (H)      Chronic pain      Diastolic CHF (H)      Gastric ulcer      ILD (interstitial lung disease) (H)      LVH (left ventricular hypertrophy)      Seronegative rheumatoid arthritis (H)      Past Surgical History:   Procedure Laterality Date     AS REVISE BREAST RECONSTRUCTION       MASTECTOMY, BILATERAL       ROTATOR CUFF REPAIR RT/LT       SPINE SURGERY       Family History   Problem Relation Age of Onset     Esophageal Cancer Father      Heart Disease Father      Heart Disease Brother      Breast Cancer Sister      Breast Cancer Maternal Aunt         x2 aunts     Ovarian Cancer Maternal Aunt      Social History     Socioeconomic History     Marital status: Single     Spouse name: Not on file     Number of children: Not on file     Years of education: Not on file     Highest education level: Not on file   Occupational History     Not on file   Social Needs     Financial resource strain: Not on file     Food insecurity     Worry: Not on file     Inability: Not on file     Transportation needs     Medical: Not on file     Non-medical: Not on file   Tobacco Use     Smoking status: Former Smoker     Packs/day: 0.50     Years: 15.00     Pack years: 7.50     Last attempt to quit: 2000     Years since quittin.5     Smokeless tobacco: Never Used   Substance and Sexual Activity     Alcohol use: Not Currently     Drug use: Not Currently     Sexual activity: Not on file   Lifestyle     Physical activity     Days per week: Not on file     Minutes per session: Not on file     Stress: Not on file   Relationships     Social  connections     Talks on phone: Not on file     Gets together: Not on file     Attends Baptism service: Not on file     Active member of club or organization: Not on file     Attends meetings of clubs or organizations: Not on file     Relationship status: Not on file     Intimate partner violence     Fear of current or ex partner: Not on file     Emotionally abused: Not on file     Physically abused: Not on file     Forced sexual activity: Not on file   Other Topics Concern     Parent/sibling w/ CABG, MI or angioplasty before 65F 55M? Not Asked   Social History Narrative     Not on file       ROS: Complete 12 point ROS negative unless mentioned in HPI    Physical Exam:  /76   Pulse 66   Temp 98.3  F (36.8  C) (Oral)   Resp 18   Wt 110.2 kg (243 lb)   SpO2 95%   BMI 38.06 kg/m      General: Sitting in wheel chair in NAD  HEENT: anicteric, mask in place   Neck: Trachea midline.   Lymphatic: no cervical or supraclavicular lymphadenopathy   Chest: Mild crackles at right base, otherwise, CTAB, no wheezing, rhonchi  Cardiac: RRR no murmurs  Abdomen: Soft, obese, non tender, active BS  Extremities: No LE Edema, moving all extremities. No digital clubbing.   Neuro: A&Ox3, no focal defects. Speech/language wnl.   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 Left back with erythematous and tender lesion with fluctuance present, about 1.5 cm in diameter.   Psych: normal affect.     Labs and Radiology: I have reviewed the results with the patient today.   All laboratory data reviewed   5/8/20: ESR 42, CRP 1.4. Cr 0.9     All cardiac studies reviewed by me.   No new cardiac studies    All imaging studies reviewed by me.   No new imaging studies.     PFT's:  Pulmonary Function Testing: personally reviewed.   Date and Site FEV1 FVC FEV1/FVC TLC RV DLCO   8/17/17 [facility] 2.07 (70%) 2.43 (64%) 85% 4.13 (75%)   15.87 (58%)   11/20/18  [facility] 1.67 (57%) 1.94 (51%) 86% 3.51 (63%)   12.13 (45%)   1/13/20 [facility] 1.48 (56%) 1.76 (53%) 84% 4.21 (77%) 2.19 (108%) 15.07 (66%)   Today's PFT interpretation: arrived late- PFTs were not completed as ordered today.      1/13/20 Six minute walk: Walk started on RA, but patient desaturated to 88%, placed on 2 LPM. Walk continued, patient had significant desaturation, but no hypoxia on 2 LPM (SpO2 dropped from 96 to 92% on 2 LPM, 88% on RA). Walk distance reduced, 265 m vs .        DME (Durable Medical Equipment) Orders and Documentation  Orders Placed This Encounter   Procedures     Nebulizer and Supplies Order      The patient was assessed and it was determined the patient is in need of the following listed DME Supplies/Equipment. Please complete supporting documentation below to demonstrate medical necessity.      Nebulizer Documentation  The patient was seen 07/22/2020. After assessment, the patient will need to be treated with ongoing nebulizer for treatment/management of small airways disease related to RA.      Again, thank you for allowing me to participate in the care of your patient.        Sincerely,        Roge Farrell MD

## 2020-07-22 NOTE — PATIENT INSTRUCTIONS
We will obtain a SNIFF test to look at diaphragm function. If reduced, may be ood to see thoracic surgery about improving this, vs consideration of CPAP therapy at night.     Continue Breo, Azithromycin, and montelukast. continue albuterol as needed.     Pulmonary rehab recommended when you are ready. Let us know!    Continue to use oxygen with exertion.     Prescribed neb machine.     Follow up in 3 months.

## 2020-07-22 NOTE — NURSING NOTE
RN faxed orders for DME nebulizer to [facility].   Sadie ePña RN BSN

## 2020-07-22 NOTE — NURSING NOTE
Chief Complaint   Patient presents with     Interstitial Lung Disease (ILD)     NSIP follow up      Medications reviewed and updated.  Vitals taken  Rebecca Deluna CMA

## 2020-07-29 DIAGNOSIS — R09.02 HYPOXIA: ICD-10-CM

## 2020-07-29 DIAGNOSIS — J84.89 NSIP (NONSPECIFIC INTERSTITIAL PNEUMONIA) (H): ICD-10-CM

## 2020-07-29 DIAGNOSIS — J98.4 SMALL AIRWAYS DISEASE: ICD-10-CM

## 2020-07-29 RX ORDER — MONTELUKAST SODIUM 10 MG/1
10 TABLET ORAL EVERY EVENING
Qty: 90 TABLET | Refills: 3 | Status: SHIPPED | OUTPATIENT
Start: 2020-07-29 | End: 2020-10-21

## 2020-07-29 RX ORDER — AZITHROMYCIN 250 MG/1
250 TABLET, FILM COATED ORAL DAILY
Qty: 90 TABLET | Refills: 3 | Status: SHIPPED | OUTPATIENT
Start: 2020-07-29 | End: 2020-10-21

## 2020-08-04 ENCOUNTER — TELEPHONE (OUTPATIENT)
Dept: GASTROENTEROLOGY | Facility: CLINIC | Age: 60
End: 2020-08-04

## 2020-08-10 RX ORDER — ONDANSETRON 2 MG/ML
4 INJECTION INTRAMUSCULAR; INTRAVENOUS
Status: CANCELLED | OUTPATIENT
Start: 2020-08-10

## 2020-08-10 RX ORDER — LIDOCAINE 40 MG/G
CREAM TOPICAL
Status: CANCELLED | OUTPATIENT
Start: 2020-08-10

## 2020-08-11 ENCOUNTER — ANESTHESIA (OUTPATIENT)
Dept: GASTROENTEROLOGY | Facility: CLINIC | Age: 60
End: 2020-08-11

## 2020-08-11 ENCOUNTER — ANESTHESIA EVENT (OUTPATIENT)
Dept: GASTROENTEROLOGY | Facility: CLINIC | Age: 60
End: 2020-08-11

## 2020-08-11 NOTE — ANESTHESIA PREPROCEDURE EVALUATION
"Anesthesia Pre-Procedure Evaluation    Patient: Ciera Chong   MRN:     [id number] Gender:   female   Age:    60 year old :      1960        Preoperative Diagnosis: Oxygen dependent [Z99.81]  ILD (interstitial lung disease) (H) [J84.9]  Class 2 obesity due to excess calories with body mass index (BMI) of 38.0 to 38.9 in adult, unspecified whether serious comorbidity present [E66.09, Z68.38]  Gastroesophageal reflux disease without esophagitis [K21.9]   Procedure(s):  ESOPHAGOGASTRODUODENOSCOPY (EGD)     LABS:  CBC:   Lab Results   Component Value Date    WBC 7.2 2020    HGB 13.0 2020    HCT 39.3 2020     2020     BMP: No results found for: NA, POTASSIUM, CHLORIDE, CO2, BUN, CR, GLC  COAGS: No results found for: PTT, INR, FIBR  POC: No results found for: BGM, HCG, HCGS  OTHER:   Lab Results   Component Value Date    ALBUMIN 3.3 (L) 2020    PROTTOTAL 7.7 2020    ALT 43 2020    AST 27 2020    ALKPHOS 96 2020    BILITOTAL 0.2 2020    CRP 10.2 (H) 2020    SED 31 (H) 2020        Preop Vitals    BP Readings from Last 3 Encounters:   20 117/76   20 124/78    Pulse Readings from Last 3 Encounters:   20 66   20 76      Resp Readings from Last 3 Encounters:   20 18   20 18    SpO2 Readings from Last 3 Encounters:   20 95%   20 96%      Temp Readings from Last 1 Encounters:   20 36.8  C (98.3  F) (Oral)    Ht Readings from Last 1 Encounters:   20 1.702 m (5' 7\")      Wt Readings from Last 1 Encounters:   20 110.2 kg (243 lb)    Estimated body mass index is 38.06 kg/m  as calculated from the following:    Height as of 20: 1.702 m (5' 7\").    Weight as of 20: 110.2 kg (243 lb).     LDA:        Past Medical History:   Diagnosis Date     Anxiety      Breast cancer (H)      Chronic pain      Diastolic CHF (H)      Gastric ulcer      ILD (interstitial lung disease) (H)  "     LVH (left ventricular hypertrophy)      Seronegative rheumatoid arthritis (H)       Past Surgical History:   Procedure Laterality Date     AS REVISE BREAST RECONSTRUCTION       MASTECTOMY, BILATERAL       ROTATOR CUFF REPAIR RT/LT       SPINE SURGERY        Allergies   Allergen Reactions     Apremilast Nausea and Vomiting     Sulfamethoxazole-Trimethoprim Hives     Sulfa Drugs Rash        Anesthesia Evaluation     .             ROS/MED HX    ENT/Pulmonary: Comment: Interstitial lung disease        Neurologic:       Cardiovascular:         METS/Exercise Tolerance:     Hematologic:         Musculoskeletal:   (+) arthritis,  -       GI/Hepatic:         Renal/Genitourinary:         Endo:     (+) Obesity, .      Psychiatric:     (+) psychiatric history anxiety      Infectious Disease:         Malignancy:   (+) Malignancy History of Breast          Other:                     JZG FV AN PHYSICAL EXAM    Assessment:   ASA SCORE: 3    H&P: History and physical reviewed and following examination; no interval change.   Smoking Status:  Non-Smoker/Unknown        Plan:   Anes. Type:  MAC   Pre-Medication: None   Induction:  N/a   Airway: Native Airway   Access/Monitoring: PIV   Maintenance: N/a     Postop Plan:   Postop Pain: None  Postop Sedation/Airway: Not planned     PONV Management:   Adult Risk Factors: Female, Non-Smoker   Prevention: Ondansetron                   Sarah Wachter, MD

## 2020-10-20 NOTE — PROGRESS NOTES
[facility]- Interstitial Lung Disease Clinic Follow Up    Assessment and Plan:  Ciera Chong is a 60 year old female with a history of seronegative RA vs psoriatic arthritis, positive FRED, breast cancer s/p bilateral mastectomy and chemotherapy (2006), anxiety, chronic pain on chronic opioids, gastric ulcers, LVH with diastolic CHF, and ILD, presenting to clinic today for follow up of NSIP and air trapping, as well as elevated diaphragm.      1. 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 RA may be causing her air trapping, and after ILD conference discussion, she was changed to ICS/LABA. ILD panel showed low positive FRED, several positive results on HP panel, and RF of 21. Today, PFTs unchanged, but feels worse overall. SNIFF test appears normal, but not yet read  -Needs to see rheumatology in follow up.   -Continue current RA treatment, remains on cimzia and prednisone.   -Continue Advair, azithromycin and montelukast    -continue PRN albuterol, gets benefit from this. Needs new nebulizer.  -will give her a spacer to use with HFA inhalers.   -Encouraged her to go to pulmonary rehab once COVID has passed. Not currently interested.   -supplemental O2 as below.   -up to date on both pneumonia vaccines (2016 and 2017)  -Annual flu vaccination (will give today)  -follow up formal read of SNIFF test.      2. Hypoxia with exertion: Needed 2 LPM with exertion at initial visit in Jan 2020. Already has this at home, as well as 4 LPM at night. Now using 4 LPM with exertion and at night.   -Advised to use 4 LPM with exertion throughout the day, and to just keep oxygen on as much as possible.   -Uses [employer] for O2    3. Congestion/URI  symptoms: have occurred all summer long, resolve for few days and come back.    -Check CXR today  -restart daily allergy pill, Zyrtec     Follow up in 3 months with PFTs and 6MWT, sooner if needed.     40 minutes spent on face to face patient care today, with >50% in counseling/coordination of care.     Roge Farrell MD   of Medicine  Division of Pulmonary, Critical Care, Allergy, and Sleep Medicine  Pager [phone]  ______________________________________________________________  CC: ILD follow up    HPI:   Ciera Chong is a 60 year old female with a history of seronegative RA vs psoriatic arthritis, positive FRED, breast cancer s/p bilateral mastectomy and chemotherapy (2006), anxiety, chronic pain on chronic opioids, gastric ulcers, LVH with diastolic CHF, and ILD, presenting to clinic today for follow up of NSIP and air trapping, as well as elevated diaphragm.      Brief summary (from prior visits):   Initially seen in January 2020 for dyspnea in the setting of seronegative RA. Previously treated with remicade, leflunomide, sulfasalazine, hydroxychloroquine, methotrexate, Otezla, Orencia, Cimzia, and prednisone. Currently on Cimzia and Prednisone for RA. Prior to establishing care with our clinic, was seen by [facility], and given oxygen. Had previously used an inhaler with benefit as well. At our evaluation, was found to have minimal NSIP change with some air trapping (could be due to RA), but we determined that majority of dyspnea and restriction was likely from kasi-diaphragm elevation. She was started on ICS/LABA (Breo), azithromycin, and montelukast. She uses oxygen 2 LPM with exertion, and 4 LPM at night. She was cahnged to Advair HFA due to insurance.      Interval history:   She was last seen in July. Since then she feels her lungs are getting worse, and other health issues have cropped up. She feels she is having more trouble breathing both at rest and with  exertion. She gets out of breath with all activities, even common household activities like making the bed or washing dishes. She takes about 20 minutes to catch her breath after these episodes. She can only walk about 10 minutes before having to stop due to breathing. She tries to avoid stairs. She is able to go down the stairs from her deck to get to her car, but avoids going to her basement due to dyspnea. She is coughing, which is productive of clear sputum, no blood. She also has nasal congestion, rhinorrhea, sore throat, and hoarse voice. She did have some fevers, which occur a few times a week, as well as chills, for about 10 years. No chest pain, palpitations. She has some LE edema, which is stable and chronic. She is also wheezing, which happens every day. This can happen at rest as well.     Appetite is low. She is trying to lose weight, but has trouble exercising, so hasn't been real successful. She does have heartburn, and is on Nexium BID without benefit. She recently had an esophageal manometry test and hasn't seen results yet. She has noted some increased erythema of the back of her legs as well. She feels her arthritis is not well controlled currently. She needs to see her rheumatologist.     She continues to use Advair BID, montelukast daily, and azithromycin daily. She does not have a Spacer. She is using the albuterol rescue inhaler, about 2 times per week, with some benefit. She has not received her nebulizer machine, but has her neb meds.     She is taking OTC cold tablets for much of the summer but continues to feel she gets URIs weekly for a few days, then resolves, every week.     She continues to use Oxygen 4 LPM with exertion and at night.     Exposure History: reviewed and unchanged.   Tobacco: former use, quit 2000. 7.5 pack years (1/2 ppd for 15 years)  Other inhaled substances: No marijuana, no vaping. No asbestos, sandblasting, welding, TB, spray painting.   Occupation: Not currently  working. 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   Pets: She has a dog at home. No birds.   Allergies: No environmental allergies.   Hobbies: No significant outdoor exposures.   Travel: None  Home: Lives in house with . There are some mold issues in the bathroom, which were abated in Nov 2019. No change in breathing since abatement. She is unsure if she uses a down comforter - not using currently. No feather pillow use. No hot tub use, no musical instrument use.     Allergies: Reviewed in EHR    Current medications: Reviewed in EHR    Past medical, surgical, social, and family histories: Personally reviewed during this visit and updated in EMR as needed    ROS: Complete 12 point ROS negative unless mentioned in HPI    Physical Exam:  /74   Pulse 71   Resp 18   Wt 110.2 kg (243 lb)   SpO2 93%   BMI 38.06 kg/m      General: Sitting in wheel chair in NAD  HEENT: anicteric, mask in place   Neck: Trachea midline.   Lymphatic: no cervical or supraclavicular lymphadenopathy   Chest: Mild crackles at right base, mild bilateral expiratory wheeze. Normal WOB.   Cardiac: RRR no murmurs  Abdomen: Soft, obese, non tender, active BS  Extremities: No LE Edema, moving all extremities. No digital clubbing. Mild ulnar deviation of fingers noted.    Neuro: A&Ox3, no focal defects. Speech/language wnl.   Skin: No rash on limited exam.   Psych: normal affect.     Labs and Radiology: I have reviewed the results with the patient today.   All laboratory data reviewed   9/29/20 [facility]: EGD samples: ESOPHAGUS, DISTAL, BIOPSY: ESOPHAGEAL SQUAMOUS MUCOSA WITH RARE INTRAEPITHELIAL EOSINOPHILS, CONSISTENT WITH REFLUX ESOPHAGITIS B. STOMACH, ANTRUM, BIOPSY: CHEMICAL (REACTIVE) GASTROPATHY, NO HELICOBACTER PYLORI ORGANISMS IDENTIFIED ON H&E AND  IMMUNOPEROXIDASE STAINED SLIDES     9/22/20 [facility]: COVID negative  9/14/20 [facility]: ESR 57 CRP 1.41  All cardiac studies reviewed by me.   No new  cardiac studies    All imaging studies reviewed by me.   No new imaging studies.     PFT's:  Pulmonary Function Testing: personally reviewed.   Date and Site FEV1 FVC FEV1/FVC TLC RV DLCO   8/17/17 [facility] 2.07 (70%) 2.43 (64%) 85% 4.13 (75%)   15.87 (58%)   11/20/18 [facility] 1.67 (57%) 1.94 (51%) 86% 3.51 (63%)   12.13 (45%)   1/13/20 [facility] 1.48 (56%) 1.76 (53%) 84% 4.21 (77%) 2.19 (108%) 15.07 (66%)   10/21/20 [facility] 1.5 (57%) 1.74 (53%) 86% 4.14 (75%) 2.19 (107% 15.29 (68%)   Today's PFT interpretation: Moderately severe restriction, unchanged from prior. Mild diffusion defect, unchanged from prior.      1/13/20 Six minute walk: Walk started on RA, but patient desaturated to 88%, placed on 2 LPM. Walk continued, patient had significant desaturation, but no hypoxia on 2 LPM (SpO2 dropped from 96 to 92% on 2 LPM, 88% on RA). Walk distance reduced, 265 m vs .          DME (Durable Medical Equipment) Orders and Documentation  Orders Placed This Encounter   Procedures     Nebulizer and Supplies Order      The patient was assessed and it was determined the patient is in need of the following listed DME Supplies/Equipment. Please complete supporting documentation below to demonstrate medical necessity.      Nebulizer Documentation  The patient was seen 10/21/2020. After assessment, the patient will need to be treated with ongoing nebulizer for treatment/management of small airways disease in the setting of RA making inhalers harder to use.

## 2020-10-21 ENCOUNTER — ANCILLARY PROCEDURE (OUTPATIENT)
Dept: GENERAL RADIOLOGY | Facility: CLINIC | Age: 60
End: 2020-10-21
Attending: INTERNAL MEDICINE
Payer: COMMERCIAL

## 2020-10-21 ENCOUNTER — OFFICE VISIT (OUTPATIENT)
Dept: PULMONOLOGY | Facility: CLINIC | Age: 60
End: 2020-10-21
Attending: INTERNAL MEDICINE
Payer: COMMERCIAL

## 2020-10-21 ENCOUNTER — PATIENT OUTREACH (OUTPATIENT)
Dept: PULMONOLOGY | Facility: CLINIC | Age: 60
End: 2020-10-21

## 2020-10-21 VITALS
HEART RATE: 71 BPM | OXYGEN SATURATION: 93 % | DIASTOLIC BLOOD PRESSURE: 74 MMHG | WEIGHT: 243 LBS | RESPIRATION RATE: 18 BRPM | BODY MASS INDEX: 38.06 KG/M2 | SYSTOLIC BLOOD PRESSURE: 109 MMHG

## 2020-10-21 DIAGNOSIS — J84.89 NSIP (NONSPECIFIC INTERSTITIAL PNEUMONIA) (H): ICD-10-CM

## 2020-10-21 DIAGNOSIS — J98.6 PARALYZED HEMIDIAPHRAGM: ICD-10-CM

## 2020-10-21 DIAGNOSIS — J98.4 SMALL AIRWAYS DISEASE: ICD-10-CM

## 2020-10-21 DIAGNOSIS — Z23 ENCOUNTER FOR IMMUNIZATION: ICD-10-CM

## 2020-10-21 DIAGNOSIS — R09.02 HYPOXIA: ICD-10-CM

## 2020-10-21 DIAGNOSIS — J96.11 CHRONIC RESPIRATORY FAILURE WITH HYPOXIA (H): ICD-10-CM

## 2020-10-21 DIAGNOSIS — J84.9 ILD (INTERSTITIAL LUNG DISEASE) (H): Primary | ICD-10-CM

## 2020-10-21 DIAGNOSIS — R05.9 COUGH: ICD-10-CM

## 2020-10-21 DIAGNOSIS — J84.9 ILD (INTERSTITIAL LUNG DISEASE) (H): ICD-10-CM

## 2020-10-21 PROCEDURE — 71046 X-RAY EXAM CHEST 2 VIEWS: CPT | Mod: GC | Performed by: RADIOLOGY

## 2020-10-21 PROCEDURE — 94729 DIFFUSING CAPACITY: CPT | Performed by: INTERNAL MEDICINE

## 2020-10-21 PROCEDURE — 99215 OFFICE O/P EST HI 40 MIN: CPT | Mod: 25 | Performed by: INTERNAL MEDICINE

## 2020-10-21 PROCEDURE — 94726 PLETHYSMOGRAPHY LUNG VOLUMES: CPT | Performed by: INTERNAL MEDICINE

## 2020-10-21 PROCEDURE — 94375 RESPIRATORY FLOW VOLUME LOOP: CPT | Performed by: INTERNAL MEDICINE

## 2020-10-21 PROCEDURE — 250N000011 HC RX IP 250 OP 636: Performed by: INTERNAL MEDICINE

## 2020-10-21 PROCEDURE — G0463 HOSPITAL OUTPT CLINIC VISIT: HCPCS | Mod: 25

## 2020-10-21 PROCEDURE — 90682 RIV4 VACC RECOMBINANT DNA IM: CPT | Performed by: INTERNAL MEDICINE

## 2020-10-21 PROCEDURE — G0008 ADMIN INFLUENZA VIRUS VAC: HCPCS | Performed by: INTERNAL MEDICINE

## 2020-10-21 PROCEDURE — 76000 FLUOROSCOPY <1 HR PHYS/QHP: CPT | Mod: GC | Performed by: RADIOLOGY

## 2020-10-21 RX ORDER — FLUTICASONE PROPIONATE AND SALMETEROL XINAFOATE 230; 21 UG/1; UG/1
2 AEROSOL, METERED RESPIRATORY (INHALATION) 2 TIMES DAILY
Qty: 1 INHALER | Refills: 11 | Status: SHIPPED | OUTPATIENT
Start: 2020-10-21 | End: 2022-05-31

## 2020-10-21 RX ORDER — AZITHROMYCIN 250 MG/1
250 TABLET, FILM COATED ORAL DAILY
Qty: 90 TABLET | Refills: 3 | Status: SHIPPED | OUTPATIENT
Start: 2020-10-21 | End: 2021-11-30

## 2020-10-21 RX ORDER — CETIRIZINE HYDROCHLORIDE 10 MG/1
10 TABLET ORAL DAILY
Qty: 30 TABLET | Refills: 5 | Status: SHIPPED | OUTPATIENT
Start: 2020-10-21 | End: 2021-06-21

## 2020-10-21 RX ORDER — MONTELUKAST SODIUM 10 MG/1
10 TABLET ORAL EVERY EVENING
Qty: 90 TABLET | Refills: 3 | Status: SHIPPED | OUTPATIENT
Start: 2020-10-21 | End: 2022-01-11

## 2020-10-21 RX ADMIN — INFLUENZA A VIRUS A/HAWAII/70/2019 (H1N1) RECOMBINANT HEMAGGLUTININ ANTIGEN, INFLUENZA A VIRUS A/MINNESOTA/41/2019 (H3N2) RECOMBINANT HEMAGGLUTININ ANTIGEN, INFLUENZA B VIRUS B/WASHINGTON/02/2019 RECOMBINANT HEMAGGLUTININ ANTIGEN, AND INFLUENZA B VIRUS B/PHUKET/3073/2013 RECOMBINANT HEMAGGLUTININ ANTIGEN 0.5 ML: 45; 45; 45; 45 INJECTION INTRAMUSCULAR at 14:39

## 2020-10-21 ASSESSMENT — PAIN SCALES - GENERAL: PAINLEVEL: NO PAIN (0)

## 2020-10-21 NOTE — PATIENT INSTRUCTIONS
Your breathing tests appear stable today, even from 2018.     Keep working on exercise and weight loss, this should help breathing and back pain, etc     Follow up with rheumatologist     We will give you a flu shot today.     Continue to take Advair twice a day, montelukast daily, and azithromycin daily for small airways disease (reordered all of these)    Use albuterol as needed for shortness of breath. Will re-order neb machine as well.     Keep using oxygen, would use it for most of the day as well as at night, if you can.     We will check breathing tests and oxygen test at next visit in 3 months.     Check CXR today, and restart daily Zyrtec (prescribed).

## 2020-10-21 NOTE — Clinical Note
Hi All-  I have previously prescribed a nebulizer for Ms. Chong, and did again today, as she never received it. Has small airways disease from RA, and could use a nebulizer due to RA in hands. Please help her get this fulfilled. She has the meds, just not the machine!  Thanks, Will

## 2020-10-21 NOTE — NURSING NOTE
Chief Complaint   Patient presents with     Interstitial Lung Disease (ILD)     Follow up     Medications reviewed and updated.  Vitals taken  Rebecca Deluna CMA

## 2020-10-21 NOTE — LETTER
10/21/2020         RE: Ciera Chong  [address]  [place] [address]        Dear Colleague,    Thank you for referring your patient, Ciera Chong, to the [facility] [place]. Please see a copy of my visit note below.    [facility]- Interstitial Lung Disease Clinic Follow Up    Assessment and Plan:  Ciera Chong is a 60 year old female with a history of seronegative RA vs psoriatic arthritis, positive FRED, breast cancer s/p bilateral mastectomy and chemotherapy (2006), anxiety, chronic pain on chronic opioids, gastric ulcers, LVH with diastolic CHF, and ILD, presenting to clinic today for follow up of NSIP and air trapping, as well as elevated diaphragm.      1. 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 RA may be causing her air trapping, and after ILD conference discussion, she was changed to ICS/LABA. ILD panel showed low positive FRED, several positive results on HP panel, and RF of 21. Today, PFTs unchanged, but feels worse overall. SNIFF test appears normal, but not yet read  -Needs to see rheumatology in follow up.   -Continue current RA treatment, remains on cimzia and prednisone.   -Continue Advair, azithromycin and montelukast    -continue PRN albuterol, gets benefit from this. Needs new nebulizer.  -will give her a spacer to use with HFA inhalers.   -Encouraged her to go to pulmonary rehab once COVID has passed. Not currently interested.   -supplemental O2 as below.   -up to date on both pneumonia vaccines (2016 and 2017)  -Annual flu vaccination (will give today)  -follow up formal read of SNIFF test.      2. Hypoxia with exertion: Needed 2 LPM with  exertion at initial visit in Jan 2020. Already has this at home, as well as 4 LPM at night. Now using 4 LPM with exertion and at night.   -Advised to use 4 LPM with exertion throughout the day, and to just keep oxygen on as much as possible.   -Uses [employer] for O2    3. Congestion/URI symptoms: have occurred all summer long, resolve for few days and come back.    -Check CXR today  -restart daily allergy pill, Zyrtec     Follow up in 3 months with PFTs and 6MWT, sooner if needed.     40 minutes spent on face to face patient care today, with >50% in counseling/coordination of care.     Roge Farrell MD   of Medicine  Division of Pulmonary, Critical Care, Allergy, and Sleep Medicine  Pager [phone]  ______________________________________________________________  CC: ILD follow up    HPI:   Ciera Chong is a 60 year old female with a history of seronegative RA vs psoriatic arthritis, positive FRED, breast cancer s/p bilateral mastectomy and chemotherapy (2006), anxiety, chronic pain on chronic opioids, gastric ulcers, LVH with diastolic CHF, and ILD, presenting to clinic today for follow up of NSIP and air trapping, as well as elevated diaphragm.      Brief summary (from prior visits):   Initially seen in January 2020 for dyspnea in the setting of seronegative RA. Previously treated with remicade, leflunomide, sulfasalazine, hydroxychloroquine, methotrexate, Otezla, Orencia, Cimzia, and prednisone. Currently on Cimzia and Prednisone for RA. Prior to establishing care with our clinic, was seen by [facility], and given oxygen. Had previously used an inhaler with benefit as well. At our evaluation, was found to have minimal NSIP change with some air trapping (could be due to RA), but we determined that majority of dyspnea and restriction was likely from kasi-diaphragm elevation. She was started on ICS/LABA (Breo), azithromycin, and montelukast. She uses oxygen 2 LPM with exertion,  and 4 LPM at night. She was cahnged to Advair HFA due to insurance.      Interval history:   She was last seen in July. Since then she feels her lungs are getting worse, and other health issues have cropped up. She feels she is having more trouble breathing both at rest and with exertion. She gets out of breath with all activities, even common household activities like making the bed or washing dishes. She takes about 20 minutes to catch her breath after these episodes. She can only walk about 10 minutes before having to stop due to breathing. She tries to avoid stairs. She is able to go down the stairs from her deck to get to her car, but avoids going to her basement due to dyspnea. She is coughing, which is productive of clear sputum, no blood. She also has nasal congestion, rhinorrhea, sore throat, and hoarse voice. She did have some fevers, which occur a few times a week, as well as chills, for about 10 years. No chest pain, palpitations. She has some LE edema, which is stable and chronic. She is also wheezing, which happens every day. This can happen at rest as well.     Appetite is low. She is trying to lose weight, but has trouble exercising, so hasn't been real successful. She does have heartburn, and is on Nexium BID without benefit. She recently had an esophageal manometry test and hasn't seen results yet. She has noted some increased erythema of the back of her legs as well. She feels her arthritis is not well controlled currently. She needs to see her rheumatologist.     She continues to use Advair BID, montelukast daily, and azithromycin daily. She does not have a Spacer. She is using the albuterol rescue inhaler, about 2 times per week, with some benefit. She has not received her nebulizer machine, but has her neb meds.     She is taking OTC cold tablets for much of the summer but continues to feel she gets URIs weekly for a few days, then resolves, every week.     She continues to use Oxygen 4 LPM  with exertion and at night.     Exposure History: reviewed and unchanged.   Tobacco: former use, quit 2000. 7.5 pack years (1/2 ppd for 15 years)  Other inhaled substances: No marijuana, no vaping. No asbestos, sandblasting, welding, TB, spray painting.   Occupation: Not currently working. 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   Pets: She has a dog at home. No birds.   Allergies: No environmental allergies.   Hobbies: No significant outdoor exposures.   Travel: None  Home: Lives in house with . There are some mold issues in the bathroom, which were abated in Nov 2019. No change in breathing since abatement. She is unsure if she uses a down comforter - not using currently. No feather pillow use. No hot tub use, no musical instrument use.     Allergies: Reviewed in EHR    Current medications: Reviewed in EHR    Past medical, surgical, social, and family histories: Personally reviewed during this visit and updated in EMR as needed    ROS: Complete 12 point ROS negative unless mentioned in HPI    Physical Exam:  /74   Pulse 71   Resp 18   Wt 110.2 kg (243 lb)   SpO2 93%   BMI 38.06 kg/m      General: Sitting in wheel chair in NAD  HEENT: anicteric, mask in place   Neck: Trachea midline.   Lymphatic: no cervical or supraclavicular lymphadenopathy   Chest: Mild crackles at right base, mild bilateral expiratory wheeze. Normal WOB.   Cardiac: RRR no murmurs  Abdomen: Soft, obese, non tender, active BS  Extremities: No LE Edema, moving all extremities. No digital clubbing. Mild ulnar deviation of fingers noted.    Neuro: A&Ox3, no focal defects. Speech/language wnl.   Skin: No rash on limited exam.   Psych: normal affect.     Labs and Radiology: I have reviewed the results with the patient today.   All laboratory data reviewed   9/29/20 [facility]: EGD samples: ESOPHAGUS, DISTAL, BIOPSY: ESOPHAGEAL SQUAMOUS MUCOSA WITH RARE INTRAEPITHELIAL EOSINOPHILS, CONSISTENT WITH REFLUX  ESOPHAGITIS B. STOMACH, ANTRUM, BIOPSY: CHEMICAL (REACTIVE) GASTROPATHY, NO HELICOBACTER PYLORI ORGANISMS IDENTIFIED ON H&E AND  IMMUNOPEROXIDASE STAINED SLIDES     9/22/20 [facility]: COVID negative  9/14/20 [facility]: ESR 57 CRP 1.41  All cardiac studies reviewed by me.   No new cardiac studies    All imaging studies reviewed by me.   No new imaging studies.     PFT's:  Pulmonary Function Testing: personally reviewed.   Date and Site FEV1 FVC FEV1/FVC TLC RV DLCO   8/17/17 [facility] 2.07 (70%) 2.43 (64%) 85% 4.13 (75%)   15.87 (58%)   11/20/18 [facility] 1.67 (57%) 1.94 (51%) 86% 3.51 (63%)   12.13 (45%)   1/13/20 [facility] 1.48 (56%) 1.76 (53%) 84% 4.21 (77%) 2.19 (108%) 15.07 (66%)   10/21/20 [facility] 1.5 (57%) 1.74 (53%) 86% 4.14 (75%) 2.19 (107% 15.29 (68%)   Today's PFT interpretation: Moderately severe restriction, unchanged from prior. Mild diffusion defect, unchanged from prior.      1/13/20 Six minute walk: Walk started on RA, but patient desaturated to 88%, placed on 2 LPM. Walk continued, patient had significant desaturation, but no hypoxia on 2 LPM (SpO2 dropped from 96 to 92% on 2 LPM, 88% on RA). Walk distance reduced, 265 m vs .      DME (Durable Medical Equipment) Orders and Documentation  Orders Placed This Encounter   Procedures     Nebulizer and Supplies Order      The patient was assessed and it was determined the patient is in need of the following listed DME Supplies/Equipment. Please complete supporting documentation below to demonstrate medical necessity.      Nebulizer Documentation  The patient was seen 10/21/2020. After assessment, the patient will need to be treated with ongoing nebulizer for treatment/management of small airways disease in the setting of RA making inhalers harder to use.    Sincerely,    Roge Farrell MD

## 2020-10-22 ENCOUNTER — PATIENT OUTREACH (OUTPATIENT)
Dept: PULMONOLOGY | Facility: CLINIC | Age: 60
End: 2020-10-22

## 2020-10-22 NOTE — TELEPHONE ENCOUNTER
Left voicemail requesting call back to confirm DME company for where Neb Machine order should be sent.

## 2020-10-25 LAB
DLCOUNC-%PRED-PRE: 68 %
DLCOUNC-PRE: 15.29 ML/MIN/MMHG
DLCOUNC-PRED: 22.43 ML/MIN/MMHG
EXPTIME-PRE: 5.85 SEC
FEF2575-%PRED-PRE: 78 %
FEF2575-PRE: 1.83 L/SEC
FEF2575-PRED: 2.34 L/SEC
FEFMAX-%PRED-PRE: 88 %
FEFMAX-PRE: 6.04 L/SEC
FEFMAX-PRED: 6.82 L/SEC
FEV1-%PRED-PRE: 57 %
FEV1-PRE: 1.5 L
FEV1FEV6-PRE: 86 %
FEV1FEV6-PRED: 81 %
FEV1FVC-PRE: 86 %
FEV1FVC-PRED: 80 %
FEV1SVC-PRE: 77 %
FEV1SVC-PRED: 71 %
FIFMAX-PRE: 6.19 L/SEC
FRCPLETH-%PRED-PRE: 76 %
FRCPLETH-PRE: 2.19 L
FRCPLETH-PRED: 2.87 L
FVC-%PRED-PRE: 53 %
FVC-PRE: 1.74 L
FVC-PRED: 3.26 L
IC-%PRED-PRE: 60 %
IC-PRE: 1.95 L
IC-PRED: 3.21 L
RVPLETH-%PRED-PRE: 107 %
RVPLETH-PRE: 2.19 L
RVPLETH-PRED: 2.02 L
TLCPLETH-%PRED-PRE: 75 %
TLCPLETH-PRE: 4.14 L
TLCPLETH-PRED: 5.44 L
VA-%PRED-PRE: 48 %
VA-PRE: 2.65 L
VC-%PRED-PRE: 53 %
VC-PRE: 1.95 L
VC-PRED: 3.67 L

## 2020-11-18 DIAGNOSIS — J84.9 ILD (INTERSTITIAL LUNG DISEASE) (H): Primary | ICD-10-CM

## 2020-11-18 DIAGNOSIS — J98.4 SMALL AIRWAYS DISEASE: ICD-10-CM

## 2020-11-18 NOTE — PROGRESS NOTES
Contacted [facility] on 11/18/20  to clarify need for patient to be provided with neb machine. Re-ordered DME for neb machine and was instructed to have patient contact [facility] tomorrow (11/19) to review order and set up [employer] shipment of equipment.     Left detailed vm for patient informing of this and provided [facility] phone number for patient to reach out tomorrow ([facility] [employer] [facility] [phone]).

## 2020-12-17 ENCOUNTER — TELEPHONE (OUTPATIENT)
Dept: PULMONOLOGY | Facility: CLINIC | Age: 60
End: 2020-12-17

## 2020-12-17 NOTE — TELEPHONE ENCOUNTER
Left voicemail for patient to contact call center (number provided) to reschedule her appointment with Dr. Farrell on 2/1 as he is no longer available that day. Patient was also informed that her testing prior to the original appointment (6 minute walking test and full pulmonary function test) would need to be rescheduled as well to prior to her new appointment with Dr. Farrell.

## 2020-12-18 ENCOUNTER — TELEPHONE (OUTPATIENT)
Dept: PULMONOLOGY | Facility: CLINIC | Age: 60
End: 2020-12-18

## 2020-12-18 NOTE — TELEPHONE ENCOUNTER
Left voicemail again for patient to contact call center (number provided) to reschedule her appointment with Dr. Farrell on 2/1 as he is no longer available that day. Patient was also informed that her testing prior to the original appointment (6 minute walking test and full pulmonary function test) would need to be rescheduled as well to prior to her new appointment with Dr. Farrell. As this is the second attempt with no response, will send patient a letter in the mail as final reminder. Of note, called mobile on number and it appears to be the wrong number.

## 2020-12-21 ENCOUNTER — TELEPHONE (OUTPATIENT)
Dept: PULMONOLOGY | Facility: CLINIC | Age: 60
End: 2020-12-21

## 2020-12-21 NOTE — TELEPHONE ENCOUNTER
[facility] Call Center    Phone Message    May a detailed message be left on voicemail: yes     Reason for Call: Other: Pt needs to reschedule 2/1 appointments, but next return interstitial lung appt with  is out in May. Per guidelines, I am not supposed to schedule with other providers than original one for interstitial lung pts, so I sent encounter. Please check if pt can be fit in sooner, ideally some point in January, for these appointments and give pt a call back.     Action Taken: Message routed to:  [facility] ([facility]): pulmonary    Travel Screening: Not Applicable

## 2020-12-21 NOTE — TELEPHONE ENCOUNTER
Attempted to call patient after she contacted call center less than half an hour ago to reschedule her appt. No answer, so left voicemail. Unfortunately, Dr. Farrell has very limited availability after February. Informed her that I would try to call her prior to leaving for the day or tomorrow between 10-12 to discuss possibly having patient complete her testing one day and then do a virtual visit (video preferred, but telephone if patient is unable to do video) with Dr. Farrell as there is more availability for virtual in January. I will reach out to patient as I do not have my own personal phone number patients can contact me at.

## 2020-12-22 ENCOUNTER — TELEPHONE (OUTPATIENT)
Dept: PULMONOLOGY | Facility: CLINIC | Age: 60
End: 2020-12-22

## 2020-12-22 NOTE — TELEPHONE ENCOUNTER
Attempted to call patient again after she contacted call center yesterday to reschedule her appt. I called shortly after she had contacted the call center with no answer, so attempted to call again prior to leaving at 4:30PM yesterday and twice today between 10-12 as discussed in her last voicemail. Called her once last time with still no answer, so left voicemail. Unfortunately, Dr. Farrell has very limited availability after February for in-person ILD. Discussed possibly having patient complete her testing one day and then do a virtual visit (video preferred, but telephone if patient is unable to do video) with Dr. Farrell as there is more availability for virtual in January. Informed her to please please contact Sri Begum at her direct number as she can assist with rescheduling patient. Sri was informed of this patient and will be anticipating her call.

## 2021-05-06 ENCOUNTER — TELEPHONE (OUTPATIENT)
Dept: PULMONOLOGY | Facility: CLINIC | Age: 61
End: 2021-05-06

## 2021-05-06 NOTE — TELEPHONE ENCOUNTER
Received communication from ISAIAS Aguilar, to set pt up for appt with Dr. Farrell on 6/1. LVM on home and mobile with direct call back to discuss scheduling appt and testing.

## 2021-05-07 ENCOUNTER — TELEPHONE (OUTPATIENT)
Dept: PULMONOLOGY | Facility: CLINIC | Age: 61
End: 2021-05-07

## 2021-05-07 NOTE — TELEPHONE ENCOUNTER
Spoke with pt and discussed scheduling follow up appt with Dr. Farrell, as instructed by ISAIAS Aguilar. Appt was initially planned for June 2, but Dr. Farrell is only conducting virtual appts in the morning, thus appt was changed to June 1st. Details confirmed with pt who requested hard copy of itinerary be mailed. Mailing address confirmed.

## 2021-06-21 DIAGNOSIS — R05.9 COUGH: ICD-10-CM

## 2021-06-21 RX ORDER — CETIRIZINE HYDROCHLORIDE 10 MG/1
10 TABLET ORAL DAILY
Qty: 30 TABLET | Refills: 5 | Status: SHIPPED | OUTPATIENT
Start: 2021-06-21 | End: 2021-12-14

## 2021-08-05 ENCOUNTER — OFFICE VISIT (OUTPATIENT)
Dept: PULMONOLOGY | Facility: CLINIC | Age: 61
End: 2021-08-05
Attending: INTERNAL MEDICINE
Payer: COMMERCIAL

## 2021-08-05 VITALS — SYSTOLIC BLOOD PRESSURE: 104 MMHG | DIASTOLIC BLOOD PRESSURE: 62 MMHG | HEART RATE: 68 BPM | OXYGEN SATURATION: 96 %

## 2021-08-05 DIAGNOSIS — R29.818 SUSPECTED SLEEP APNEA: Primary | ICD-10-CM

## 2021-08-05 DIAGNOSIS — J84.9 ILD (INTERSTITIAL LUNG DISEASE) (H): ICD-10-CM

## 2021-08-05 DIAGNOSIS — J96.11 CHRONIC RESPIRATORY FAILURE WITH HYPOXIA (H): ICD-10-CM

## 2021-08-05 DIAGNOSIS — J84.89 NSIP (NONSPECIFIC INTERSTITIAL PNEUMONIA) (H): ICD-10-CM

## 2021-08-05 DIAGNOSIS — R06.02 SHORTNESS OF BREATH: ICD-10-CM

## 2021-08-05 LAB
6 MIN WALK (FT): 800 FT
6 MIN WALK (M): 244 M

## 2021-08-05 PROCEDURE — 99417 PROLNG OP E/M EACH 15 MIN: CPT | Performed by: INTERNAL MEDICINE

## 2021-08-05 PROCEDURE — G0463 HOSPITAL OUTPT CLINIC VISIT: HCPCS | Mod: 25

## 2021-08-05 PROCEDURE — 94726 PLETHYSMOGRAPHY LUNG VOLUMES: CPT | Performed by: INTERNAL MEDICINE

## 2021-08-05 PROCEDURE — 94375 RESPIRATORY FLOW VOLUME LOOP: CPT | Performed by: INTERNAL MEDICINE

## 2021-08-05 PROCEDURE — 99215 OFFICE O/P EST HI 40 MIN: CPT | Mod: 25 | Performed by: INTERNAL MEDICINE

## 2021-08-05 PROCEDURE — 99207 PR NO CHARGE LOS: CPT

## 2021-08-05 PROCEDURE — 94729 DIFFUSING CAPACITY: CPT | Performed by: INTERNAL MEDICINE

## 2021-08-05 PROCEDURE — 94618 PULMONARY STRESS TESTING: CPT | Performed by: INTERNAL MEDICINE

## 2021-08-05 NOTE — LETTER
8/5/2021         RE: Ciera Chong  [address]  [place] [address]        Dear Colleague,    Thank you for referring your patient, Ciera Chong, to the [facility] [place]. Please see a copy of my visit note below.    [facility]  ILD Clinic - Return Visit -  8/5/2021         Assessment and Plan:   Ciera Chong is a 60 year old female with a history of seronegative RA vs psoriatic arthritis, positive FRED, breast cancer s/p bilateral mastectomy and chemotherapy (2006), anxiety, chronic pain on chronic opioids, gastric ulcers, LVH with diastolic CHF, and ILD, presenting to clinic today for follow up of NSIP and air trapping, as well as elevated diaphragm.      1) ILD  - Minimal NSIP, air trapping in setting of morbid obesity and seronegative RA, moderately severe restrictive lung disease with chronically elevated right kasi-diaphragm.  Her respiratory symptoms as well as oxygen requirement seems out of proportion to the radiographic extent of her disease, and I suspect she may have a component of OHS/SHAJI (see below).   - There may be a component of small airways disease / OB given radiographic air trapping, and she was changed to ICS/LABA previously by Dr. Farrell.    - She feels dyspneic when she takes Advair and would like to discontinue.  OK to stop and monitor symptoms.  Would continue Azithromycin and Singulair for now.   - Repeat echo on return. I would also like to repeat CT given her isolated decline in TLC and increased symptoms.     A) Supplemental oxygen  - 2L/min w/ exertion at initial visit in Jan 2020. Already has this at home, as well as 4 LPM at night. Typically only uses nightly O2.  - Advised to use 2 LPM with exertion throughout the day    B) Pulmonary rehabilitation  - Will discuss pulmonary rehabilitation, although she is limited by arthralgias, lowe back pain    2)  Elevated hemidiaphragm  - Sniff test showed decreased excursion but no paralys  - May benefit from BiPAP at night if OHS/SHAJI present.      3) Suspect sleep apnea  - In addition, she endorses loud snoring, unrestful sleep and occasional morning headaches. CO2 levels from prior labs ranged from 26-30 in 2019, however there are no recent labs since 2/2021.  High clinical suspicion for SHAJI/OHS, especially given her hypoxemia seem to be out of proportion to radiographic abnormality.  Would plan on obtaining a room air ABG.   - Referral to sleep medicine was given .    4) Obesity  - Her height is 5'7'', weight is 245lb with a BMI of 38.4.    ADDENDUM: Her CT was reviewed at ILD conference on 9/13/2021. Again notable for significantly elevated R hemidiaphragm, mild NSIP appears essentially unchanged from her prior study in 1/2020.  There is significant tracheobronchomalacia and mosaicism c/w air trapping suggesting both large and small airways disease. Reviewed sniff test, while not paralyzed, regionally decreased R hemidiaphram motion.  RA ABG inconsistent w/ hypoventilation, mildly increased A-a gradient.    Plan:  - Her MERAZ is likely multifactorial given morbid obesity, significant loss of R lung volume 2/2 diaphragm elevation, small and large airways disease.   - Discussed potential surgical options including referral to thoracic surgery for possible stent placement for tracheobronchomalacia.  Plication of hemidiaphragm w/ questionable benefit since it is not paralyzed.  Nocturnal BiPAP may be helpful, will await Sleep recommendation.    - Referral to weight loss clinic if patient amenable.       I spent 70  minutes  total today reviewing medical records including progress notes and multiple imaging studies from her previous available records.     Angela Jenkins MD [facility]    RTC: 5 months  Immunization: Up-to-date on COVID-19 vaccination, Prevnar, Pneumovax.          Problem List:     1. Interstitial lung  disease  a. Symptom Onset: 3-4 years ago started wheezing  b. Diagnosis: NSIP  c. Serology: (1/2020) FRED 1:320, ANCA - neg, Dory-1 - neg, SSA, SSB - neg, RA - 21 (<20), CCP-2, CRP-10.2 (0-8) , CK-67, Aldolase-7.4, ESR - 31 (0-38)  d. 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 Treatment: Advair and Albuterol as needed.   f. Oxygen: 2L/min w/ exertion  g. Exercise / Functional status: Uses a wheelchair for doctors visits due to back pain. Activities limited due to dyspnea and chronic pain.     2. GERD    3. Elevated Right hemidiaphragm  a. Unclear cause    4. Seronegative RA vs. Psoriatic arthritis  a. On Prednisone 5mg (on and off Prednisone, usually at higher doses for about 20 years, on 5mg daily for about 2-3 years)  b. Certolizumab (Cimzia for about 2-3 years)    5. Diastolic dysfunction     6. Anxiety    7. Chronic back pain on opioids  a. On Oxycontin and Percocet  b. Flexeril, Lyrica    8. Obesity  a. There is no height or weight on file to calculate BMI.     9. Breast cancer s/p bilateral mastectomy and chemotherapy in 2006    10. HTN  a. Propranolol 40mg    11. Migraine  a. On Sumatriptan     12. Dyslipidemia  a. On Pravachol 20mg daily          Interval History:     Ciera Chong is a 60 year old female with a history of seronegative RA vs psoriatic arthritis, positive FRED, breast cancer s/p bilateral mastectomy and chemotherapy (2006), anxiety, chronic pain on chronic opioids, gastric ulcers, LVH with diastolic dysfunction, and ILD, presenting to clinic today for follow up of NSIP and air trapping, as well as elevated diaphragm.      Since her last visit, she reports worsening of her respiratory status. She feels as though it is more difficult for her for her to get around, cleaning her house etc., but also states her activities are limited due to her chronic pain.     She has been using Advair as well as Singulair and Azithromycin, however does not feel as though it is  helping.  She feels more dyspneic after she uses Advair and states she her symptoms have not changed and is wondering if she can stop it.     She does not sleep well at night.  She sleeps about 7-8 hours, but reports poor quality of sleep, partially due to her chronic pain.  She does not feel rested when she wakes up.  She does endorse loud snoring and occasional morning headaches.         Brief summary (from prior visits):   Initially seen in January 2020 for dyspnea in the setting of seronegative RA. Previously treated with remicade, leflunomide, sulfasalazine, hydroxychloroquine, methotrexate, Otezla, Orencia, Cimzia, and prednisone. Currently on Cimzia and Prednisone for RA. Prior to establishing care with our clinic, was seen by [facility], and given oxygen. Had previously used an inhaler with benefit as well. At our evaluation, was found to have minimal NSIP change with some air trapping (could be due to RA), but we determined that majority of dyspnea and restriction was likely from kasi-diaphragm elevation. She was started on ICS/LABA. She was last seen in April 2020, at which time she had started Breo, and azithromycin and montelukast were added. She was using oxygen 2 LPM with exertion, and 4 LPM at night.      Interval history:   Overall, Ms. Chong does not feel she is improving. She remains short of breath. Dyspnea typically occurs with exertion, especially when moving around. She is not short of breath at rest. She can only walk about <100 feet on flat ground before having to stop, however back pain contributes to this as well. She avoids stairs. She is not coughing. She does have some rhinorrhea and congestion, this is stable. She occasional sore throat. No chest pain, palpitations, or LE edema. She does have some chronic stomach pain. She denies nausea, vomiting. She does have heartburn, on nexium- which doesn't seem to control symptoms.     She continues to use the Breo inhaler daily, as well  as montelukast and Singulair. She is not using her nebulizer, as she does not have a nebulizer machine- just the meds. She no longer feels her albuterol inhaler is making much of a difference for her. She continues to use oxygen, 2 LPM with exertion, and 4 LPM at night.     Since last visit, she has also developed a sore on her back. This is getting bigger. She has not been evaluated for this. She also has a sore on the back of her leg, and the back of her arm, these are circular and erythematous lesions that she gets from time to time. Her RA symptoms are relatively controlled at this point. She remains on the Cimzia and prednisone 5 mg daily. She has put her down comforter away and is not using it currently.               Past Medical and Surgical History:     Past Medical History:   Diagnosis Date     Anxiety      Breast cancer (H)      Chronic pain      Diastolic CHF (H)      Gastric ulcer      ILD (interstitial lung disease) (H)      LVH (left ventricular hypertrophy)      Seronegative rheumatoid arthritis (H)      Past Surgical History:   Procedure Laterality Date     AS REVISE BREAST RECONSTRUCTION       MASTECTOMY, BILATERAL       ROTATOR CUFF REPAIR RT/LT       SPINE SURGERY              Social History:     Social History     Tobacco Use     Smoking status: Former Smoker     Packs/day: 0.50     Years: 15.00     Pack years: 7.50     Quit date: 2000     Years since quittin.6     Smokeless tobacco: Never Used   Substance Use Topics     Alcohol use: Not Currently     Drug use: Not Currently     Social History     Social History Narrative     Not on file              Medications:     Current Outpatient Medications   Medication     albuterol (PROAIR HFA/PROVENTIL HFA/VENTOLIN HFA) 108 (90 Base) MCG/ACT inhaler     amLODIPine (NORVASC) 5 MG tablet     azithromycin (ZITHROMAX) 250 MG tablet     buPROPion (WELLBUTRIN SR) 200 MG 12 hr tablet     cetirizine (ZYRTEC) 10 MG tablet     cetirizine (ZYRTEC) 10 MG  tablet     cyclobenzaprine (FLEXERIL) 10 MG tablet     diclofenac (VOLTAREN) 1 % topical gel     DULoxetine (CYMBALTA) 30 MG capsule     DULoxetine (CYMBALTA) 60 MG capsule     esomeprazole (NEXIUM) 40 MG DR capsule     fluticasone (FLONASE) 50 MCG/ACT nasal spray     fluticasone-salmeterol (ADVAIR HFA) 230-21 MCG/ACT inhaler     melatonin 5 MG tablet     montelukast (SINGULAIR) 10 MG tablet     Multiple Vitamin (THERA-TABS) TABS     oxyCODONE (OXYCONTIN) 20 MG 12 hr tablet     oxyCODONE-acetaminophen (PERCOCET) 5-325 MG tablet     polyethylene glycol (MIRALAX/GLYCOLAX) powder     pravastatin (PRAVACHOL) 20 MG tablet     predniSONE (DELTASONE) 1 MG tablet     pregabalin (LYRICA) 150 MG capsule     propranolol (INDERAL) 40 MG tablet     sucralfate (CARAFATE) 1 GM/10ML suspension     SUMAtriptan (IMITREX) 50 MG tablet     zolpidem ER (AMBIEN CR) 6.25 MG CR tablet     albuterol (PROVENTIL) (2.5 MG/3ML) 0.083% neb solution     mirtazapine (REMERON) 7.5 MG tablet     No current facility-administered medications for this visit.              Physical Exam:   /62   Pulse 68   SpO2 96%   GENERAL: alert, NAD  HEENT: NCAT, EOMI, no scleral icterus, oral mucosa moist and without lesions  Neck: no cervical or supraclavicular adenopathy  Lungs: good air flow, no crackles, rhonchi or wheezing  CV: RRR, S1S2, no murmurs noted  Abdomen: normoactive BS, soft, non tender   Lymph: no edema  Neuro: AAO X 3  Psychiatric: normal affect, good eye contact  Skin: no rash, jaundice or lesions on limited exam  Extremities: No cyanosis, clubbing or edema. No digital edema, no synovitis or joint swelling.  No ulcers, skin thickening or fissures.           Imaging:     HRCT (1/13/2020)  IMPRESSION:   1. Decreased groundglass opacities throughout the lungs with mild residual mosaic attenuation and slight air trapping on expiratory images. Findings are nonspecific but may be seen with small airways disease such as asthma or bronchiolitis  obliterans and with interstitial lung disease such as hypersensitivity pneumonitis.  2. Hepatic steatosis.  3. Elevated right hemidiaphragm possibly diaphragmatic paralysis.           Pulmonary Function Tests:     PFT interpretation (August 5, 2021)  Maneuver: Did not meet ATS criteria, interpret with caution.  Spirometry: FEV over FVC is 82, FVC is 52% predicted, suggestive of moderate restriction.  Lung volumes: TLC 66% predicted, consistent with mild restriction. ERV is also disproportionately reduced, consistent with obesity.  Diffusing capacity: There is mild impairment in diffusing capacity, however this was not corrected for the patient's hemoglobin  Compared to her previous examination on 10/2020, there is no significant difference in FVC, however there is 500 mL decline in TLC.    PFT's:  Pulmonary Function Testing: personally reviewed.   Date and Site FEV1 FVC FEV1/FVC TLC RV DLCO   8/17/17 [facility] 2.07 (70%) 2.43 (64%) 85% 4.13 (75%)   15.87 (58%)   11/20/18 [facility] 1.67 (57%) 1.94 (51%) 86% 3.51 (63%)   12.13 (45%)   1/13/20 [facility] 1.48 (56%) 1.76 (53%) 84% 4.21 (77%) 2.19 (108%) 15.07 (66%)                  Six-minute walk test  Date 6MWD RA SpO2 Resting O2 req Exercise O2 req Lowest SpO2 on amb   1/13/2020 525 93 RA 2L/min 93   8/5/2021 800 NA NA 2L/min 93            Laboratory:     Serology ([facility]; 1/2020)  FRED 1:320  ANCA - neg  Dory-1 - neg  SSA, SSB - neg  RA - 21 (<20)  CCP-2  CRP-10.2 (0-8)   CK-67  Aldolase-7.4  ESR - 31 (0-38)  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        Sincerely,        Angela Jenkins MD

## 2021-08-05 NOTE — NURSING NOTE
Chief Complaint   Patient presents with     Interstitial Lung Disease (ILD)     follow up     Vitals were taken and medications were reconciled.     REE Ortiz

## 2021-08-05 NOTE — PROGRESS NOTES
[facility]  ILD Clinic - Return Visit -  8/5/2021         Assessment and Plan:   Ciera Chong is a 60 year old female with a history of seronegative RA vs psoriatic arthritis, positive FRED, breast cancer s/p bilateral mastectomy and chemotherapy (2006), anxiety, chronic pain on chronic opioids, gastric ulcers, LVH with diastolic CHF, and ILD, presenting to clinic today for follow up of NSIP and air trapping, as well as elevated diaphragm.      1) ILD  - Minimal NSIP, air trapping in setting of morbid obesity and seronegative RA, moderately severe restrictive lung disease with chronically elevated right kasi-diaphragm.  Her respiratory symptoms as well as oxygen requirement seems out of proportion to the radiographic extent of her disease, and I suspect she may have a component of OHS/SHAJI (see below).   - There may be a component of small airways disease / OB given radiographic air trapping, and she was changed to ICS/LABA previously by Dr. Farrell.    - She feels dyspneic when she takes Advair and would like to discontinue.  OK to stop and monitor symptoms.  Would continue Azithromycin and Singulair for now.   - Repeat echo on return. I would also like to repeat CT given her isolated decline in TLC and increased symptoms.     A) Supplemental oxygen  - 2L/min w/ exertion at initial visit in Jan 2020. Already has this at home, as well as 4 LPM at night. Typically only uses nightly O2.  - Advised to use 2 LPM with exertion throughout the day    B) Pulmonary rehabilitation  - Will discuss pulmonary rehabilitation, although she is limited by arthralgias, lowe back pain    2) Elevated hemidiaphragm  - Sniff test showed decreased excursion but no paralys  - May benefit from BiPAP at night if OHS/SHAJI present.      3) Suspect sleep apnea  - In addition, she endorses loud snoring, unrestful sleep and occasional morning headaches. CO2 levels from prior labs ranged  from 26-30 in 2019, however there are no recent labs since 2/2021.  High clinical suspicion for SHAJI/OHS, especially given her hypoxemia seem to be out of proportion to radiographic abnormality.  Would plan on obtaining a room air ABG.   - Referral to sleep medicine was given .    4) Obesity  - Her height is 5'7'', weight is 245lb with a BMI of 38.4.    ADDENDUM: Her CT was reviewed at ILD conference on 9/13/2021. Again notable for significantly elevated R hemidiaphragm, mild NSIP appears essentially unchanged from her prior study in 1/2020.  There is significant tracheobronchomalacia and mosaicism c/w air trapping suggesting both large and small airways disease. Reviewed sniff test, while not paralyzed, regionally decreased R hemidiaphram motion.  RA ABG inconsistent w/ hypoventilation, mildly increased A-a gradient.    Plan:  - Her MERAZ is likely multifactorial given morbid obesity, significant loss of R lung volume 2/2 diaphragm elevation, small and large airways disease.   - Discussed potential surgical options including referral to thoracic surgery for possible stent placement for tracheobronchomalacia.  Plication of hemidiaphragm w/ questionable benefit since it is not paralyzed.  Nocturnal BiPAP may be helpful, will await Sleep recommendation.    - Referral to weight loss clinic if patient amenable.       I spent 70  minutes  total today reviewing medical records including progress notes and multiple imaging studies from her previous available records.     Angela Jenkins MD MSCI    RTC: 5 months  Immunization: Up-to-date on COVID-19 vaccination, Prevnar, Pneumovax.          Problem List:     1. Interstitial lung disease  a. Symptom Onset: 3-4 years ago started wheezing  b. Diagnosis: NSIP  c. Serology: (1/2020) FRED 1:320, ANCA - neg, Dory-1 - neg, SSA, SSB - neg, RA - 21 (<20), CCP-2, CRP-10.2 (0-8) , CK-67, Aldolase-7.4, ESR - 31 (0-38)  d. 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 Treatment: Advair and Albuterol as needed.   f. Oxygen: 2L/min w/ exertion  g. Exercise / Functional status: Uses a wheelchair for doctors visits due to back pain. Activities limited due to dyspnea and chronic pain.     2. GERD    3. Elevated Right hemidiaphragm  a. Unclear cause    4. Seronegative RA vs. Psoriatic arthritis  a. On Prednisone 5mg (on and off Prednisone, usually at higher doses for about 20 years, on 5mg daily for about 2-3 years)  b. Certolizumab (Cimzia for about 2-3 years)    5. Diastolic dysfunction     6. Anxiety    7. Chronic back pain on opioids  a. On Oxycontin and Percocet  b. Flexeril, Lyrica    8. Obesity  a. There is no height or weight on file to calculate BMI.     9. Breast cancer s/p bilateral mastectomy and chemotherapy in 2006    10. HTN  a. Propranolol 40mg    11. Migraine  a. On Sumatriptan     12. Dyslipidemia  a. On Pravachol 20mg daily          Interval History:     Ciera Chong is a 60 year old female with a history of seronegative RA vs psoriatic arthritis, positive FRED, breast cancer s/p bilateral mastectomy and chemotherapy (2006), anxiety, chronic pain on chronic opioids, gastric ulcers, LVH with diastolic dysfunction, and ILD, presenting to clinic today for follow up of NSIP and air trapping, as well as elevated diaphragm.      Since her last visit, she reports worsening of her respiratory status. She feels as though it is more difficult for her for her to get around, cleaning her house etc., but also states her activities are limited due to her chronic pain.     She has been using Advair as well as Singulair and Azithromycin, however does not feel as though it is helping.  She feels more dyspneic after she uses Advair and states she her symptoms have not changed and is wondering if she can stop it.     She does not sleep well at night.  She sleeps about 7-8 hours, but reports poor quality of sleep, partially due to her chronic pain.  She does not feel rested when  she wakes up.  She does endorse loud snoring and occasional morning headaches.         Brief summary (from prior visits):   Initially seen in January 2020 for dyspnea in the setting of seronegative RA. Previously treated with remicade, leflunomide, sulfasalazine, hydroxychloroquine, methotrexate, Otezla, Orencia, Cimzia, and prednisone. Currently on Cimzia and Prednisone for RA. Prior to establishing care with our clinic, was seen by [facility], and given oxygen. Had previously used an inhaler with benefit as well. At our evaluation, was found to have minimal NSIP change with some air trapping (could be due to RA), but we determined that majority of dyspnea and restriction was likely from kasi-diaphragm elevation. She was started on ICS/LABA. She was last seen in April 2020, at which time she had started Breo, and azithromycin and montelukast were added. She was using oxygen 2 LPM with exertion, and 4 LPM at night.      Interval history:   Overall, Ms. Chong does not feel she is improving. She remains short of breath. Dyspnea typically occurs with exertion, especially when moving around. She is not short of breath at rest. She can only walk about <100 feet on flat ground before having to stop, however back pain contributes to this as well. She avoids stairs. She is not coughing. She does have some rhinorrhea and congestion, this is stable. She occasional sore throat. No chest pain, palpitations, or LE edema. She does have some chronic stomach pain. She denies nausea, vomiting. She does have heartburn, on nexium- which doesn't seem to control symptoms.     She continues to use the Breo inhaler daily, as well as montelukast and Singulair. She is not using her nebulizer, as she does not have a nebulizer machine- just the meds. She no longer feels her albuterol inhaler is making much of a difference for her. She continues to use oxygen, 2 LPM with exertion, and 4 LPM at night.     Since last visit, she has also  developed a sore on her back. This is getting bigger. She has not been evaluated for this. She also has a sore on the back of her leg, and the back of her arm, these are circular and erythematous lesions that she gets from time to time. Her RA symptoms are relatively controlled at this point. She remains on the Cimzia and prednisone 5 mg daily. She has put her down comforter away and is not using it currently.               Past Medical and Surgical History:     Past Medical History:   Diagnosis Date     Anxiety      Breast cancer (H)      Chronic pain      Diastolic CHF (H)      Gastric ulcer      ILD (interstitial lung disease) (H)      LVH (left ventricular hypertrophy)      Seronegative rheumatoid arthritis (H)      Past Surgical History:   Procedure Laterality Date     AS REVISE BREAST RECONSTRUCTION       MASTECTOMY, BILATERAL       ROTATOR CUFF REPAIR RT/LT       SPINE SURGERY              Social History:     Social History     Tobacco Use     Smoking status: Former Smoker     Packs/day: 0.50     Years: 15.00     Pack years: 7.50     Quit date: 2000     Years since quittin.6     Smokeless tobacco: Never Used   Substance Use Topics     Alcohol use: Not Currently     Drug use: Not Currently     Social History     Social History Narrative     Not on file              Medications:     Current Outpatient Medications   Medication     albuterol (PROAIR HFA/PROVENTIL HFA/VENTOLIN HFA) 108 (90 Base) MCG/ACT inhaler     amLODIPine (NORVASC) 5 MG tablet     azithromycin (ZITHROMAX) 250 MG tablet     buPROPion (WELLBUTRIN SR) 200 MG 12 hr tablet     cetirizine (ZYRTEC) 10 MG tablet     cetirizine (ZYRTEC) 10 MG tablet     cyclobenzaprine (FLEXERIL) 10 MG tablet     diclofenac (VOLTAREN) 1 % topical gel     DULoxetine (CYMBALTA) 30 MG capsule     DULoxetine (CYMBALTA) 60 MG capsule     esomeprazole (NEXIUM) 40 MG DR capsule     fluticasone (FLONASE) 50 MCG/ACT nasal spray     fluticasone-salmeterol (ADVAIR HFA)  230-21 MCG/ACT inhaler     melatonin 5 MG tablet     montelukast (SINGULAIR) 10 MG tablet     Multiple Vitamin (THERA-TABS) TABS     oxyCODONE (OXYCONTIN) 20 MG 12 hr tablet     oxyCODONE-acetaminophen (PERCOCET) 5-325 MG tablet     polyethylene glycol (MIRALAX/GLYCOLAX) powder     pravastatin (PRAVACHOL) 20 MG tablet     predniSONE (DELTASONE) 1 MG tablet     pregabalin (LYRICA) 150 MG capsule     propranolol (INDERAL) 40 MG tablet     sucralfate (CARAFATE) 1 GM/10ML suspension     SUMAtriptan (IMITREX) 50 MG tablet     zolpidem ER (AMBIEN CR) 6.25 MG CR tablet     albuterol (PROVENTIL) (2.5 MG/3ML) 0.083% neb solution     mirtazapine (REMERON) 7.5 MG tablet     No current facility-administered medications for this visit.              Physical Exam:   /62   Pulse 68   SpO2 96%   GENERAL: alert, NAD  HEENT: NCAT, EOMI, no scleral icterus, oral mucosa moist and without lesions  Neck: no cervical or supraclavicular adenopathy  Lungs: good air flow, no crackles, rhonchi or wheezing  CV: RRR, S1S2, no murmurs noted  Abdomen: normoactive BS, soft, non tender   Lymph: no edema  Neuro: AAO X 3  Psychiatric: normal affect, good eye contact  Skin: no rash, jaundice or lesions on limited exam  Extremities: No cyanosis, clubbing or edema. No digital edema, no synovitis or joint swelling.  No ulcers, skin thickening or fissures.           Imaging:     HRCT (1/13/2020)  IMPRESSION:   1. Decreased groundglass opacities throughout the lungs with mild residual mosaic attenuation and slight air trapping on expiratory images. Findings are nonspecific but may be seen with small airways disease such as asthma or bronchiolitis obliterans and with interstitial lung disease such as hypersensitivity pneumonitis.  2. Hepatic steatosis.  3. Elevated right hemidiaphragm possibly diaphragmatic paralysis.           Pulmonary Function Tests:     PFT interpretation (August 5, 2021)  Maneuver: Did not meet ATS criteria, interpret with  caution.  Spirometry: FEV over FVC is 82, FVC is 52% predicted, suggestive of moderate restriction.  Lung volumes: TLC 66% predicted, consistent with mild restriction. ERV is also disproportionately reduced, consistent with obesity.  Diffusing capacity: There is mild impairment in diffusing capacity, however this was not corrected for the patient's hemoglobin  Compared to her previous examination on 10/2020, there is no significant difference in FVC, however there is 500 mL decline in TLC.    PFT's:  Pulmonary Function Testing: personally reviewed.   Date and Site FEV1 FVC FEV1/FVC TLC RV DLCO   8/17/17 [facility] 2.07 (70%) 2.43 (64%) 85% 4.13 (75%)   15.87 (58%)   11/20/18 [facility] 1.67 (57%) 1.94 (51%) 86% 3.51 (63%)   12.13 (45%)   1/13/20 [facility] 1.48 (56%) 1.76 (53%) 84% 4.21 (77%) 2.19 (108%) 15.07 (66%)                  Six-minute walk test  Date 6MWD RA SpO2 Resting O2 req Exercise O2 req Lowest SpO2 on amb   1/13/2020 525 93 RA 2L/min 93   8/5/2021 800 NA NA 2L/min 93            Laboratory:     Serology ([facility]; 1/2020)  FRED 1:320  ANCA - neg  Dory-1 - neg  SSA, SSB - neg  RA - 21 (<20)  CCP-2  CRP-10.2 (0-8)   CK-67  Aldolase-7.4  ESR - 31 (0-38)  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

## 2021-08-06 LAB
DLCOUNC-%PRED-PRE: 65 %
DLCOUNC-PRE: 14.6 ML/MIN/MMHG
DLCOUNC-PRED: 22.34 ML/MIN/MMHG
ERV-%PRED-PRE: 14 %
ERV-PRE: 0.06 L
ERV-PRED: 0.44 L
EXPTIME-PRE: 6.39 SEC
FEF2575-%PRED-PRE: 59 %
FEF2575-PRE: 1.38 L/SEC
FEF2575-PRED: 2.3 L/SEC
FEFMAX-%PRED-PRE: 76 %
FEFMAX-PRE: 5.17 L/SEC
FEFMAX-PRED: 6.77 L/SEC
FEV1-%PRED-PRE: 54 %
FEV1-PRE: 1.39 L
FEV1FEV6-PRE: 82 %
FEV1FEV6-PRED: 81 %
FEV1FVC-PRE: 82 %
FEV1FVC-PRED: 80 %
FEV1SVC-PRE: 73 %
FEV1SVC-PRED: 70 %
FIFMAX-PRE: 4.72 L/SEC
FRCPLETH-%PRED-PRE: 62 %
FRCPLETH-PRE: 1.8 L
FRCPLETH-PRED: 2.87 L
FVC-%PRED-PRE: 52 %
FVC-PRE: 1.7 L
FVC-PRED: 3.23 L
IC-%PRED-PRE: 57 %
IC-PRE: 1.83 L
IC-PRED: 3.21 L
RVPLETH-%PRED-PRE: 85 %
RVPLETH-PRE: 1.74 L
RVPLETH-PRED: 2.04 L
TLCPLETH-%PRED-PRE: 66 %
TLCPLETH-PRE: 3.63 L
TLCPLETH-PRED: 5.44 L
VA-%PRED-PRE: 52 %
VA-PRE: 2.86 L
VC-%PRED-PRE: 51 %
VC-PRE: 1.9 L
VC-PRED: 3.65 L

## 2021-08-09 ENCOUNTER — TELEPHONE (OUTPATIENT)
Dept: PULMONOLOGY | Facility: CLINIC | Age: 61
End: 2021-08-09

## 2021-08-09 DIAGNOSIS — J84.9 ILD (INTERSTITIAL LUNG DISEASE) (H): Primary | ICD-10-CM

## 2021-08-09 DIAGNOSIS — R09.02 HYPOXIA: ICD-10-CM

## 2021-08-09 NOTE — TELEPHONE ENCOUNTER
Tests  Received: Today  Nilesh Palma Sophia, RN; P Interstitial [facility] Disease Clinic Nurses-  Agree that it is better here as I need a PFT lab to do the ABG's - please place the orders for both (ABG's & CT) so I can arrange over here ...     Chavez           Previous Messages       ----- Message -----   From: Lauren Sol, RN   Sent: 8/9/2021   8:13 AM CDT   To: Angela Mckeon MD, *     With the ABG and CT, it may be for the best to have done in our facility but I am not sure it really matters. Chavez- if you would help arrange this, I will give her a heads up later this morning and place the orders.     Thank you,     Lauren   ----- Message -----   From: Angela Jenkins MD   Sent: 8/6/2021   4:52 PM CDT   To: Nilesh Palma, *     Hello,    I initially told her we can do a CT on her return visit, but I think we should do it before. I know she lives about an hour and change away, is there another [place]  facility she can get this done that?    Thank you,         Message  Received: 3 days ago  Angela Jenkins MD P Inter[facility] Disease Clinic Nurses-; Nilesh Palma,     I briefly talked to her about this, but forgot to order it. Can we get a room air ABG on this patient? I want to see if she is truly hypoxemic, or she is hypoventilating from obesity hypoventilation.

## 2021-08-24 ENCOUNTER — ANCILLARY PROCEDURE (OUTPATIENT)
Dept: CARDIOLOGY | Facility: CLINIC | Age: 61
End: 2021-08-24
Attending: INTERNAL MEDICINE
Payer: COMMERCIAL

## 2021-08-24 ENCOUNTER — ANCILLARY PROCEDURE (OUTPATIENT)
Dept: CT IMAGING | Facility: CLINIC | Age: 61
End: 2021-08-24
Attending: INTERNAL MEDICINE
Payer: COMMERCIAL

## 2021-08-24 DIAGNOSIS — J84.9 ILD (INTERSTITIAL LUNG DISEASE) (H): ICD-10-CM

## 2021-08-24 DIAGNOSIS — R06.02 SHORTNESS OF BREATH: ICD-10-CM

## 2021-08-24 LAB
BASE EXCESS BLDA CALC-SCNC: -0.1 MMOL/L (ref -9.6–2)
COHGB MFR BLD: 0.8 % (ref 0–2)
FIO2-PRE: 21 %
HCO3 BLDA-SCNC: 24 MMOL/L (ref 21–28)
HGB BLD-MCNC: 12.8 G/DL (ref 11.7–15.7)
LVEF ECHO: NORMAL
METHGB MFR BLD: 0.1 % (ref 0–3)
O2/TOTAL GAS SETTING VFR VENT: 21 %
OXYHGB MFR BLDA: 93 % (ref 92–100)
PCO2 BLDA: 44 MM HG (ref 35–45)
PH BLDA: 7.37 [PH] (ref 7.35–7.45)
PO2 BLDA: 71 MM HG (ref 80–105)

## 2021-08-24 PROCEDURE — 36600 WITHDRAWAL OF ARTERIAL BLOOD: CPT | Performed by: INTERNAL MEDICINE

## 2021-08-24 PROCEDURE — 71250 CT THORAX DX C-: CPT | Mod: GC | Performed by: RADIOLOGY

## 2021-08-24 PROCEDURE — 93306 TTE W/DOPPLER COMPLETE: CPT | Performed by: STUDENT IN AN ORGANIZED HEALTH CARE EDUCATION/TRAINING PROGRAM

## 2021-08-24 RX ORDER — ACYCLOVIR 200 MG/1
16 CAPSULE ORAL ONCE
Status: COMPLETED | OUTPATIENT
Start: 2021-08-24 | End: 2021-08-24

## 2021-08-24 RX ADMIN — Medication 5 ML: at 16:00

## 2021-08-24 RX ADMIN — ACYCLOVIR 16 ML: 200 CAPSULE ORAL at 16:01

## 2021-09-13 ENCOUNTER — TEAM CONFERENCE (OUTPATIENT)
Dept: PULMONOLOGY | Facility: CLINIC | Age: 61
End: 2021-09-13

## 2021-09-13 NOTE — TELEPHONE ENCOUNTER
ILD Conference      Patient Name: Ciera Chong    Pertinent Clinical History: Minimal NSIP, air trapping in setting of morbid obesity and seronegative RA, moderately severe restrictive lung disease with chronically elevated right kasi-diaphragm.    Reason for conference discussion/Specific Question:  Treatment , CT scan review    Referring Physician:  Dr Farrell    Radiology Interpretation: Again notable for significantly elevated R hemidiaphragm, mild NSIP appears essentially unchanged from her prior study in 1/2020.  There is significant tracheobronchomalacia and mosaicism c/w air trapping suggesting both large and small airways disease. Reviewed sniff test, while not paralyzed, regionally decreased R hemidiaphram motion.   RA ABG inconsistent w/ hypoventilation, mildly increased A-a gradient.       Alireza Edwards MD (radiology)    Pathology Interpretation: n/a       There was a consensus recommendation for the following actions:     MERAZ is likely multifactorial given morbid obesity, significant loss of R lung volume 2/2 diaphragm elevation, small and large airways disease.   - Discussed potential surgical options including referral to thoracic surgery for possible stent placement for tracheobronchomalacia.  Plication of hemidiaphragm w/ questionable benefit since it is not paralyzed.  Nocturnal BiPAP may be helpful, will await Sleep recommendation.     - Referral to weight loss clinic if patient amenable.     Pulmonary/ILD Provider: Angela Jenkins MD (pulm)

## 2021-09-27 PROBLEM — K57.90 DIVERTICULOSIS OF INTESTINE: Status: ACTIVE | Noted: 2021-09-27

## 2021-09-27 PROBLEM — E78.5 HYPERLIPIDEMIA: Status: ACTIVE | Noted: 2021-09-27

## 2021-09-27 PROBLEM — E66.01 CLASS 2 SEVERE OBESITY DUE TO EXCESS CALORIES WITH SERIOUS COMORBIDITY AND BODY MASS INDEX (BMI) OF 38.0 TO 38.9 IN ADULT (H): Chronic | Status: ACTIVE | Noted: 2021-09-27

## 2021-09-27 PROBLEM — J98.6 ELEVATED HEMIDIAPHRAGM: Chronic | Status: ACTIVE | Noted: 2021-09-27

## 2021-09-27 PROBLEM — J98.6 ELEVATED HEMIDIAPHRAGM: Status: ACTIVE | Noted: 2021-09-27

## 2021-09-27 PROBLEM — I10 HYPERTENSION: Status: ACTIVE | Noted: 2021-09-27

## 2021-09-27 PROBLEM — K21.9 GERD (GASTROESOPHAGEAL REFLUX DISEASE): Status: ACTIVE | Noted: 2021-09-27

## 2021-09-27 PROBLEM — E66.812 CLASS 2 SEVERE OBESITY DUE TO EXCESS CALORIES WITH SERIOUS COMORBIDITY AND BODY MASS INDEX (BMI) OF 38.0 TO 38.9 IN ADULT (H): Chronic | Status: ACTIVE | Noted: 2021-09-27

## 2021-09-27 PROBLEM — J96.11 CHRONIC RESPIRATORY FAILURE WITH HYPOXIA (H): Status: ACTIVE | Noted: 2021-09-27

## 2021-09-27 PROBLEM — J84.89 NSIP (NONSPECIFIC INTERSTITIAL PNEUMONIA) (H): Status: ACTIVE | Noted: 2020-06-27

## 2021-09-27 PROBLEM — M19.90 INFLAMMATORY ARTHRITIS: Chronic | Status: ACTIVE | Noted: 2021-09-27

## 2021-09-27 PROBLEM — J84.89 NSIP (NONSPECIFIC INTERSTITIAL PNEUMONIA) (H): Chronic | Status: ACTIVE | Noted: 2020-06-27

## 2021-09-27 PROBLEM — K21.9 GERD (GASTROESOPHAGEAL REFLUX DISEASE): Chronic | Status: ACTIVE | Noted: 2021-09-27

## 2021-11-19 DIAGNOSIS — R09.02 HYPOXIA: ICD-10-CM

## 2021-11-19 DIAGNOSIS — J98.4 SMALL AIRWAYS DISEASE: ICD-10-CM

## 2021-11-19 DIAGNOSIS — J84.89 NSIP (NONSPECIFIC INTERSTITIAL PNEUMONIA) (H): ICD-10-CM

## 2021-11-29 ENCOUNTER — TELEPHONE (OUTPATIENT)
Dept: TRANSPLANT | Facility: CLINIC | Age: 61
End: 2021-11-29
Payer: COMMERCIAL

## 2021-11-29 DIAGNOSIS — J84.89 NSIP (NONSPECIFIC INTERSTITIAL PNEUMONIA) (H): ICD-10-CM

## 2021-11-29 DIAGNOSIS — R09.02 HYPOXIA: ICD-10-CM

## 2021-11-29 DIAGNOSIS — J98.4 SMALL AIRWAYS DISEASE: ICD-10-CM

## 2021-11-29 NOTE — TELEPHONE ENCOUNTER
[facility] Call Center    Phone Message    May a detailed message be left on voicemail: yes     Reason for Call: Other: Pt has been out of azithromycin (ZITHROMAX) 250 MG tablet for 2 days and has already received a 3 day emergency supply from the pharmacy.  Please contact pharmacy [facility] [address] [place] [address] 120 to have script refilled. If this script is no longer availalbe please contact Cristóbal [phone] (pt significant other) to advise what next steps are.       Action Taken: Message routed to:  [facility] ([facility] Pulm    Travel Screening: Not Applicable

## 2021-11-30 RX ORDER — AZITHROMYCIN 250 MG/1
250 TABLET, FILM COATED ORAL DAILY
Qty: 90 TABLET | Refills: 3 | Status: SHIPPED | OUTPATIENT
Start: 2021-11-30

## 2021-11-30 NOTE — TELEPHONE ENCOUNTER
E-Refill for daily azithromycin sent to [facility] (requesting pharmacy).     Lauren Sol, RN, BSN  ILD Nurse Care Coordinator  (P) [phone]

## 2021-12-02 RX ORDER — AZITHROMYCIN 250 MG/1
250 TABLET, FILM COATED ORAL DAILY
Qty: 90 TABLET | Refills: 3 | Status: SHIPPED | OUTPATIENT
Start: 2021-12-02 | End: 2022-12-01

## 2021-12-14 DIAGNOSIS — R05.9 COUGH: ICD-10-CM

## 2021-12-14 RX ORDER — CETIRIZINE HYDROCHLORIDE 10 MG/1
10 TABLET ORAL DAILY
Qty: 30 TABLET | Refills: 5 | Status: SHIPPED | OUTPATIENT
Start: 2021-12-14

## 2022-01-11 DIAGNOSIS — J98.4 SMALL AIRWAYS DISEASE: ICD-10-CM

## 2022-01-11 DIAGNOSIS — R09.02 HYPOXIA: ICD-10-CM

## 2022-01-11 DIAGNOSIS — J84.89 NSIP (NONSPECIFIC INTERSTITIAL PNEUMONIA) (H): ICD-10-CM

## 2022-01-11 RX ORDER — MONTELUKAST SODIUM 10 MG/1
10 TABLET ORAL EVERY EVENING
Qty: 90 TABLET | Refills: 3 | Status: SHIPPED | OUTPATIENT
Start: 2022-01-11 | End: 2023-01-16

## 2022-05-26 ENCOUNTER — ANCILLARY PROCEDURE (OUTPATIENT)
Dept: CT IMAGING | Facility: CLINIC | Age: 62
End: 2022-05-26
Attending: INTERNAL MEDICINE
Payer: COMMERCIAL

## 2022-05-26 ENCOUNTER — OFFICE VISIT (OUTPATIENT)
Dept: PULMONOLOGY | Facility: CLINIC | Age: 62
End: 2022-05-26
Attending: INTERNAL MEDICINE
Payer: COMMERCIAL

## 2022-05-26 VITALS
HEIGHT: 67 IN | OXYGEN SATURATION: 97 % | DIASTOLIC BLOOD PRESSURE: 77 MMHG | WEIGHT: 252 LBS | SYSTOLIC BLOOD PRESSURE: 120 MMHG | HEART RATE: 66 BPM | BODY MASS INDEX: 39.55 KG/M2

## 2022-05-26 DIAGNOSIS — J84.9 ILD (INTERSTITIAL LUNG DISEASE) (H): ICD-10-CM

## 2022-05-26 DIAGNOSIS — J84.9 ILD (INTERSTITIAL LUNG DISEASE) (H): Primary | ICD-10-CM

## 2022-05-26 PROCEDURE — 94375 RESPIRATORY FLOW VOLUME LOOP: CPT | Performed by: INTERNAL MEDICINE

## 2022-05-26 PROCEDURE — 94729 DIFFUSING CAPACITY: CPT | Performed by: INTERNAL MEDICINE

## 2022-05-26 PROCEDURE — 71250 CT THORAX DX C-: CPT | Mod: GC | Performed by: RADIOLOGY

## 2022-05-26 PROCEDURE — 99214 OFFICE O/P EST MOD 30 MIN: CPT | Mod: 25 | Performed by: INTERNAL MEDICINE

## 2022-05-26 PROCEDURE — G0463 HOSPITAL OUTPT CLINIC VISIT: HCPCS | Mod: 25

## 2022-05-26 RX ORDER — ALBUTEROL SULFATE 90 UG/1
1-2 AEROSOL, METERED RESPIRATORY (INHALATION) EVERY 4 HOURS PRN
Qty: 18 G | Refills: 11 | Status: SHIPPED | OUTPATIENT
Start: 2022-05-26 | End: 2024-03-14

## 2022-05-26 ASSESSMENT — PAIN SCALES - GENERAL: PAINLEVEL: MODERATE PAIN (5)

## 2022-05-26 NOTE — LETTER
5/26/2022         RE: Ciera Chong  [address]  [facility] [id number]        Dear Colleague,    Thank you for referring your patient, Ciera Chong, to the [facility] LUNG SCIENCE AND [facility] [place]. Please see a copy of my visit note below.    Reason for Visit  Ciera Chong is a 61 year old year old female who is being seen for Interstitial Lung Disease (ILD) (ILD Follow up)    ILD HPI    Ciera Chong is a 61-year-old female who is seen today for follow-up of interstitial lung disease.  The patient was previously followed by Dr. Jenkins.  She has dyspnea of likely multifactorial causes.  She does have some minimal NSIP on her CT scan however there is certainly this has been out of proportion to her symptoms.  She also has a significantly elevated right hemidiaphragm that has sluggish movement but does not been clearly paralyzed on a fluoroscopy sniff test.  She also likely has untreated obstructive sleep apnea however she did not follow-up with sleep after her last visit with Dr. Jenkins and had been recommended.  She returns clinic today stating she feels her breathing is worse since last visit since about January she has noted increased dyspnea on exertion and needing more oxygen although she does not have an oximeter so she does not check her oxygen saturations.  She is generally 3 L of oxygen 24/7.  She does not increase it with exertion.  She did continue to use her Advair which she feels helps her although she ran out of her albuterol inhaler and she has not been using that.  She had an echocardiogram that was suggestive of possible mild pulmonary hypertension although this has not been followed up.  Otherwise she continues to have significant musculoskeletal issues that limit her mobility as well.      Current Outpatient Medications   Medication     albuterol (PROAIR HFA/PROVENTIL HFA/VENTOLIN HFA) 108 (90 Base) MCG/ACT inhaler     amLODIPine (NORVASC) 5 MG tablet      azithromycin (ZITHROMAX) 250 MG tablet     azithromycin (ZITHROMAX) 250 MG tablet     buPROPion (WELLBUTRIN SR) 200 MG 12 hr tablet     cetirizine (ZYRTEC) 10 MG tablet     cetirizine (ZYRTEC) 10 MG tablet     cyclobenzaprine (FLEXERIL) 10 MG tablet     diclofenac (VOLTAREN) 1 % topical gel     DULoxetine (CYMBALTA) 30 MG capsule     DULoxetine (CYMBALTA) 60 MG capsule     esomeprazole (NEXIUM) 40 MG DR capsule     fluticasone (FLONASE) 50 MCG/ACT nasal spray     fluticasone-salmeterol (ADVAIR HFA) 230-21 MCG/ACT inhaler     melatonin 5 MG tablet     montelukast (SINGULAIR) 10 MG tablet     Multiple Vitamin (THERA-TABS) TABS     oxyCODONE (OXYCONTIN) 20 MG 12 hr tablet     oxyCODONE-acetaminophen (PERCOCET) 5-325 MG tablet     polyethylene glycol (MIRALAX/GLYCOLAX) powder     pravastatin (PRAVACHOL) 20 MG tablet     predniSONE (DELTASONE) 1 MG tablet     pregabalin (LYRICA) 150 MG capsule     propranolol (INDERAL) 40 MG tablet     sucralfate (CARAFATE) 1 GM/10ML suspension     SUMAtriptan (IMITREX) 50 MG tablet     zolpidem ER (AMBIEN CR) 6.25 MG CR tablet     albuterol (PROVENTIL) (2.5 MG/3ML) 0.083% neb solution     mirtazapine (REMERON) 7.5 MG tablet     No current facility-administered medications for this visit.     Facility-Administered Medications Ordered in Other Visits   Medication     sodium chloride (PF) 0.9% PF flush 10 mL     Allergies   Allergen Reactions     Apremilast Nausea and Vomiting     Sulfamethoxazole-Trimethoprim Hives     Sulfa Drugs Rash     Past Medical History:   Diagnosis Date     Anxiety      Breast cancer (H)      Chronic pain      Gastric ulcer      ILD (interstitial lung disease) (H)      LVH (left ventricular hypertrophy)      Seronegative rheumatoid arthritis (H)        Past Surgical History:   Procedure Laterality Date     APPENDECTOMY  1978     AS REVISE BREAST RECONSTRUCTION  02/2008     BACK SURGERY  02/26/2009    LUMBAR HEMILAMINECTOMY 2/26/09   Right G4-H1-Jgonoaslcxktnst      BACK SURGERY  2010    LUMBAR HEMILAMINECTOMY 3/4/10   Left O4-W7-Jzwbtjanyyygjqv, Decompression     BLADDER SUSPENSION       CARPAL TUNNEL RELEASE RT/LT Right 2010     HYSTERECTOMY  2006    HYSTERECTOMY    with bilateral salpingoophrectomy     LAPAROSCOPY      LAPAROSCOPY    endometrosis     MASTECTOMY, BILATERAL  2006    MASTECTOMY 06   bilateral breast     ROTATOR CUFF REPAIR RT/LT  2011     SPINE SURGERY         Social History     Socioeconomic History     Marital status: Single     Spouse name: Not on file     Number of children: Not on file     Years of education: Not on file     Highest education level: Not on file   Occupational History     Not on file   Tobacco Use     Smoking status: Former Smoker     Packs/day: 0.50     Years: 15.00     Pack years: 7.50     Quit date: 2000     Years since quittin.4     Smokeless tobacco: Never Used   Substance and Sexual Activity     Alcohol use: Not Currently     Drug use: Not Currently     Sexual activity: Not on file   Other Topics Concern     Parent/sibling w/ CABG, MI or angioplasty before 65F 55M? Not Asked   Social History Narrative    No asbestos, sandblasting, welding, TB, spray painting.         Occupation: Not currently working. 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     Pets: She has a dog at home. No birds.     Allergies: No environmental allergies.     Hobbies: No significant outdoor exposures.     Travel: None    Home: Lives in house with . There are some mold issues in the bathroom, which were abated in 2019. No change in breathing since abatement. She is unsure if she uses a down comforter - not using currently. No feather pillow use. No hot tub use, no musical instrument use.      Social Determinants of Health     Financial Resource Strain: Not on file   Food Insecurity: Not on file   Transportation Needs: Not on file   Physical Activity: Not on file   Stress: Not  "on file   Social Connections: Not on file   Intimate Partner Violence: Not on file   Housing Stability: Not on file       Family History   Problem Relation Age of Onset     Esophageal Cancer Father      Heart Disease Father      Heart Disease Brother      Breast Cancer Sister      Breast Cancer Maternal Aunt         x2 aunts     Ovarian Cancer Maternal Aunt        ROS Pulmonary    A complete ROS was otherwise negative except as noted in the HPI.  Vitals:    05/26/22 1306   BP: 120/77   Pulse: 66   SpO2: 97%   Weight: 114.3 kg (252 lb)   Height: 1.702 m (5' 7\")     Exam:   GENERAL APPEARANCE: Well developed, obese, alert, and in no apparent distress.  NECK: supple, no masses, no thyromegaly.  LYMPHATICS: No significant axillary, cervical, or supraclavicular nodes.  RESP: good air flow throughout, - no crackles, rhonchi or wheezes.  CV: Normal S1, S2, regular rhythm, normal rate, no rub, no murmur,  no gallop, no LE edema.   ABDOMEN:  Bowel sounds normal, soft, nontender, no HSM or masses.   MS: extremities normal- no clubbing, no cyanosis.  SKIN: no rash on limited exam  NEURO: Mentation intact, speech normal, normal strength and tone, normal gait and stance  PSYCH: mentation appears normal. and affect normal/bright  Results: I have reviewed all imaging, PFTs and other relavent tests, please see below for details, PFT and imaging results were reviewed with the patient.  PFTs: Severe restriction with moderate reduction in diffusing capacity.  There has been about a 500 cc drop in her FVC compared to her last visit.  Her DLCO is also down.  However she did have trouble with the DLCO maneuver today.    Assessment and plan:    61-year-old female with mild interstitial lung disease in the setting of seronegative rheumatoid arthritis.  The etiology of her drop in her PFTs and increased dyspnea is unclear therefore I am going to get a high-resolution CT scan of the chest today.  We will follow-up on this with the patient " and if there is evidence of increased groundglass opacities may consider increasing her prednisone.  If there is significant progression of her interstitial lung disease and would need to consider treatment.  However as I discussed with the patient I think her symptoms are multifactorial I do think the possibility of untreated sleep apnea is very significant and if so would be contributing to her symptoms so I encouraged her again to follow-up with the sleep physician and we will enter this referral again.  I also will make referral to weight management clinic as her morbid obesity is also likely contributing and ultimately if she had progressive lung disease and required a transplant she would need to lose a significant amount of weight to be a candidate.  I will follow-up with the patient after the CT scan is done.  Hopefully she will attend the above-mentioned appointments and I will plan to see her back in 3 months with pulmonary function test.    I spent 30 minutes on the date of the encounter doing chart review, history and exam, interpretation of any new PFTs and imaging, documentation and further activities as noted above.        CBC   Recent Labs   Lab Test 08/24/21  1339 01/13/20  1758   RBC  --  4.25   HGB 12.8 13.0   HCT  --  39.3   PLT  --  289       Basic Metabolic Panel  No results for input(s): NA, POTASSIUM, CHLORIDE, CO2, BUN, CT, GLC, YE in the last 18031 hours.    INR  No results for input(s): INR in the last 57388 hours.    PFT  PFT Latest Ref Rng & Units 5/26/2022   FVC L 1.22   FEV1 L 1.06   FVC% % 37   FEV1% % 41                 Sincerely,    David Morris Perlman, MD

## 2022-05-26 NOTE — PROGRESS NOTES
Reason for Visit  Ciera Chong is a 61 year old year old female who is being seen for Interstitial Lung Disease (ILD) (ILD Follow up)    ILD HPI    Ciera Chong is a 61-year-old female who is seen today for follow-up of interstitial lung disease.  The patient was previously followed by Dr. Jenkins.  She has dyspnea of likely multifactorial causes.  She does have some minimal NSIP on her CT scan however there is certainly this has been out of proportion to her symptoms.  She also has a significantly elevated right hemidiaphragm that has sluggish movement but does not been clearly paralyzed on a fluoroscopy sniff test.  She also likely has untreated obstructive sleep apnea however she did not follow-up with sleep after her last visit with Dr. Jenkins and had been recommended.  She returns clinic today stating she feels her breathing is worse since last visit since about January she has noted increased dyspnea on exertion and needing more oxygen although she does not have an oximeter so she does not check her oxygen saturations.  She is generally 3 L of oxygen 24/7.  She does not increase it with exertion.  She did continue to use her Advair which she feels helps her although she ran out of her albuterol inhaler and she has not been using that.  She had an echocardiogram that was suggestive of possible mild pulmonary hypertension although this has not been followed up.  Otherwise she continues to have significant musculoskeletal issues that limit her mobility as well.      Current Outpatient Medications   Medication     albuterol (PROAIR HFA/PROVENTIL HFA/VENTOLIN HFA) 108 (90 Base) MCG/ACT inhaler     amLODIPine (NORVASC) 5 MG tablet     azithromycin (ZITHROMAX) 250 MG tablet     azithromycin (ZITHROMAX) 250 MG tablet     buPROPion (WELLBUTRIN SR) 200 MG 12 hr tablet     cetirizine (ZYRTEC) 10 MG tablet     cetirizine (ZYRTEC) 10 MG tablet     cyclobenzaprine (FLEXERIL) 10 MG tablet     diclofenac (VOLTAREN) 1 %  topical gel     DULoxetine (CYMBALTA) 30 MG capsule     DULoxetine (CYMBALTA) 60 MG capsule     esomeprazole (NEXIUM) 40 MG DR capsule     fluticasone (FLONASE) 50 MCG/ACT nasal spray     fluticasone-salmeterol (ADVAIR HFA) 230-21 MCG/ACT inhaler     melatonin 5 MG tablet     montelukast (SINGULAIR) 10 MG tablet     Multiple Vitamin (THERA-TABS) TABS     oxyCODONE (OXYCONTIN) 20 MG 12 hr tablet     oxyCODONE-acetaminophen (PERCOCET) 5-325 MG tablet     polyethylene glycol (MIRALAX/GLYCOLAX) powder     pravastatin (PRAVACHOL) 20 MG tablet     predniSONE (DELTASONE) 1 MG tablet     pregabalin (LYRICA) 150 MG capsule     propranolol (INDERAL) 40 MG tablet     sucralfate (CARAFATE) 1 GM/10ML suspension     SUMAtriptan (IMITREX) 50 MG tablet     zolpidem ER (AMBIEN CR) 6.25 MG CR tablet     albuterol (PROVENTIL) (2.5 MG/3ML) 0.083% neb solution     mirtazapine (REMERON) 7.5 MG tablet     No current facility-administered medications for this visit.     Facility-Administered Medications Ordered in Other Visits   Medication     sodium chloride (PF) 0.9% PF flush 10 mL     Allergies   Allergen Reactions     Apremilast Nausea and Vomiting     Sulfamethoxazole-Trimethoprim Hives     Sulfa Drugs Rash     Past Medical History:   Diagnosis Date     Anxiety      Breast cancer (H)      Chronic pain      Gastric ulcer      ILD (interstitial lung disease) (H)      LVH (left ventricular hypertrophy)      Seronegative rheumatoid arthritis (H)        Past Surgical History:   Procedure Laterality Date     APPENDECTOMY  1978     AS REVISE BREAST RECONSTRUCTION  02/2008     BACK SURGERY  02/26/2009    LUMBAR HEMILAMINECTOMY 2/26/09   Right C8-Y7-Zydyvvnvavefrkl     BACK SURGERY  03/04/2010    LUMBAR HEMILAMINECTOMY 3/4/10   Left A2-A0-Lyxiuyffjsvxtbo, Decompression     BLADDER SUSPENSION       CARPAL TUNNEL RELEASE RT/LT Right 11/11/2010     HYSTERECTOMY  2006    HYSTERECTOMY 2006   with bilateral salpingoophrectomy     LAPAROSCOPY  1990     LAPAROSCOPY    endometrosis     MASTECTOMY, BILATERAL  2006    MASTECTOMY 06   bilateral breast     ROTATOR CUFF REPAIR RT/LT       SPINE SURGERY         Social History     Socioeconomic History     Marital status: Single     Spouse name: Not on file     Number of children: Not on file     Years of education: Not on file     Highest education level: Not on file   Occupational History     Not on file   Tobacco Use     Smoking status: Former Smoker     Packs/day: 0.50     Years: 15.00     Pack years: 7.50     Quit date: 2000     Years since quittin.4     Smokeless tobacco: Never Used   Substance and Sexual Activity     Alcohol use: Not Currently     Drug use: Not Currently     Sexual activity: Not on file   Other Topics Concern     Parent/sibling w/ CABG, MI or angioplasty before 65F 55M? Not Asked   Social History Narrative    No asbestos, sandblasting, welding, TB, spray painting.         Occupation: Not currently working. 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     Pets: She has a dog at home. No birds.     Allergies: No environmental allergies.     Hobbies: No significant outdoor exposures.     Travel: None    Home: Lives in house with . There are some mold issues in the bathroom, which were abated in 2019. No change in breathing since abatement. She is unsure if she uses a down comforter - not using currently. No feather pillow use. No hot tub use, no musical instrument use.      Social Determinants of Health     Financial Resource Strain: Not on file   Food Insecurity: Not on file   Transportation Needs: Not on file   Physical Activity: Not on file   Stress: Not on file   Social Connections: Not on file   Intimate Partner Violence: Not on file   Housing Stability: Not on file       Family History   Problem Relation Age of Onset     Esophageal Cancer Father      Heart Disease Father      Heart Disease Brother      Breast Cancer Sister       "Breast Cancer Maternal Aunt         x2 aunts     Ovarian Cancer Maternal Aunt        ROS Pulmonary    A complete ROS was otherwise negative except as noted in the HPI.  Vitals:    05/26/22 1306   BP: 120/77   Pulse: 66   SpO2: 97%   Weight: 114.3 kg (252 lb)   Height: 1.702 m (5' 7\")     Exam:   GENERAL APPEARANCE: Well developed, obese, alert, and in no apparent distress.  NECK: supple, no masses, no thyromegaly.  LYMPHATICS: No significant axillary, cervical, or supraclavicular nodes.  RESP: good air flow throughout, - no crackles, rhonchi or wheezes.  CV: Normal S1, S2, regular rhythm, normal rate, no rub, no murmur,  no gallop, no LE edema.   ABDOMEN:  Bowel sounds normal, soft, nontender, no HSM or masses.   MS: extremities normal- no clubbing, no cyanosis.  SKIN: no rash on limited exam  NEURO: Mentation intact, speech normal, normal strength and tone, normal gait and stance  PSYCH: mentation appears normal. and affect normal/bright  Results: I have reviewed all imaging, PFTs and other relavent tests, please see below for details, PFT and imaging results were reviewed with the patient.  PFTs: Severe restriction with moderate reduction in diffusing capacity.  There has been about a 500 cc drop in her FVC compared to her last visit.  Her DLCO is also down.  However she did have trouble with the DLCO maneuver today.    Assessment and plan:    61-year-old female with mild interstitial lung disease in the setting of seronegative rheumatoid arthritis.  The etiology of her drop in her PFTs and increased dyspnea is unclear therefore I am going to get a high-resolution CT scan of the chest today.  We will follow-up on this with the patient and if there is evidence of increased groundglass opacities may consider increasing her prednisone.  If there is significant progression of her interstitial lung disease and would need to consider treatment.  However as I discussed with the patient I think her symptoms are " multifactorial I do think the possibility of untreated sleep apnea is very significant and if so would be contributing to her symptoms so I encouraged her again to follow-up with the sleep physician and we will enter this referral again.  I also will make referral to weight management clinic as her morbid obesity is also likely contributing and ultimately if she had progressive lung disease and required a transplant she would need to lose a significant amount of weight to be a candidate.  I will follow-up with the patient after the CT scan is done.  Hopefully she will attend the above-mentioned appointments and I will plan to see her back in 3 months with pulmonary function test.    I spent 30 minutes on the date of the encounter doing chart review, history and exam, interpretation of any new PFTs and imaging, documentation and further activities as noted above.        CBC   Recent Labs   Lab Test 08/24/21  1339 01/13/20  1758   RBC  --  4.25   HGB 12.8 13.0   HCT  --  39.3   PLT  --  289       Basic Metabolic Panel  No results for input(s): NA, POTASSIUM, CHLORIDE, CO2, BUN, CT, GLC, YE in the last 42393 hours.    INR  No results for input(s): INR in the last 93307 hours.    PFT  PFT Latest Ref Rng & Units 5/26/2022   FVC L 1.22   FEV1 L 1.06   FVC% % 37   FEV1% % 41           CC:

## 2022-05-26 NOTE — NURSING NOTE
Chief Complaint   Patient presents with     Interstitial Lung Disease (ILD)     ILD Follow up     Vitals were taken and medications were reconciled.     Monalisa Riggs RMA  1:09 PM

## 2022-05-31 DIAGNOSIS — J98.4 SMALL AIRWAYS DISEASE: ICD-10-CM

## 2022-05-31 RX ORDER — FLUTICASONE PROPIONATE AND SALMETEROL XINAFOATE 230; 21 UG/1; UG/1
2 AEROSOL, METERED RESPIRATORY (INHALATION) 2 TIMES DAILY
Qty: 8 G | Refills: 6 | Status: SHIPPED | OUTPATIENT
Start: 2022-05-31

## 2022-06-07 LAB
DLCOUNC-%PRED-PRE: 50 %
DLCOUNC-PRE: 11.18 ML/MIN/MMHG
DLCOUNC-PRED: 22.34 ML/MIN/MMHG
ERV-%PRED-PRE: 22 %
ERV-PRE: 0.09 L
ERV-PRED: 0.41 L
EXPTIME-PRE: 6.05 SEC
FEF2575-%PRED-PRE: 53 %
FEF2575-PRE: 1.23 L/SEC
FEF2575-PRED: 2.3 L/SEC
FEFMAX-%PRED-PRE: 66 %
FEFMAX-PRE: 4.52 L/SEC
FEFMAX-PRED: 6.77 L/SEC
FEV1-%PRED-PRE: 41 %
FEV1-PRE: 1.06 L
FEV1FEV6-PRE: 87 %
FEV1FEV6-PRED: 81 %
FEV1FVC-PRE: 87 %
FEV1FVC-PRED: 80 %
FEV1SVC-PRE: 87 %
FEV1SVC-PRED: 70 %
FIFMAX-PRE: 4.09 L/SEC
FVC-%PRED-PRE: 37 %
FVC-PRE: 1.22 L
FVC-PRED: 3.23 L
IC-%PRED-PRE: 34 %
IC-PRE: 1.12 L
IC-PRED: 3.24 L
VA-%PRED-PRE: 39 %
VA-PRE: 2.16 L
VC-%PRED-PRE: 33 %
VC-PRE: 1.21 L
VC-PRED: 3.65 L

## 2022-08-26 ENCOUNTER — OFFICE VISIT (OUTPATIENT)
Dept: PULMONOLOGY | Facility: CLINIC | Age: 62
End: 2022-08-26
Attending: PHYSICIAN ASSISTANT
Payer: COMMERCIAL

## 2022-08-26 VITALS
HEIGHT: 67 IN | SYSTOLIC BLOOD PRESSURE: 120 MMHG | OXYGEN SATURATION: 97 % | HEART RATE: 76 BPM | BODY MASS INDEX: 39.71 KG/M2 | DIASTOLIC BLOOD PRESSURE: 73 MMHG | WEIGHT: 253 LBS

## 2022-08-26 DIAGNOSIS — J84.89 NSIP (NONSPECIFIC INTERSTITIAL PNEUMONIA) (H): Primary | ICD-10-CM

## 2022-08-26 DIAGNOSIS — J98.4 SMALL AIRWAYS DISEASE: Primary | ICD-10-CM

## 2022-08-26 DIAGNOSIS — J84.9 ILD (INTERSTITIAL LUNG DISEASE) (H): ICD-10-CM

## 2022-08-26 LAB
DLCOUNC-%PRED-PRE: 52 %
DLCOUNC-PRE: 11.62 ML/MIN/MMHG
DLCOUNC-PRED: 22.25 ML/MIN/MMHG
ERV-%PRED-PRE: 15 %
ERV-PRE: 0.06 L
ERV-PRED: 0.4 L
EXPTIME-PRE: 9.75 SEC
FEF2575-%PRED-PRE: 38 %
FEF2575-PRE: 0.88 L/SEC
FEF2575-PRED: 2.25 L/SEC
FEFMAX-%PRED-PRE: 64 %
FEFMAX-PRE: 4.34 L/SEC
FEFMAX-PRED: 6.71 L/SEC
FEV1-%PRED-PRE: 41 %
FEV1-PRE: 1.05 L
FEV1FEV6-PRE: 82 %
FEV1FEV6-PRED: 81 %
FEV1FVC-PRE: 79 %
FEV1FVC-PRED: 80 %
FEV1SVC-PRE: 63 %
FEV1SVC-PRED: 70 %
FIFMAX-PRE: 5.11 L/SEC
FVC-%PRED-PRE: 41 %
FVC-PRE: 1.34 L
FVC-PRED: 3.2 L
IC-%PRED-PRE: 49 %
IC-PRE: 1.62 L
IC-PRED: 3.23 L
VA-%PRED-PRE: 49 %
VA-PRE: 2.66 L
VC-%PRED-PRE: 46 %
VC-PRE: 1.68 L
VC-PRED: 3.63 L

## 2022-08-26 PROCEDURE — 94375 RESPIRATORY FLOW VOLUME LOOP: CPT | Performed by: INTERNAL MEDICINE

## 2022-08-26 PROCEDURE — 99213 OFFICE O/P EST LOW 20 MIN: CPT | Mod: 25 | Performed by: INTERNAL MEDICINE

## 2022-08-26 PROCEDURE — 94729 DIFFUSING CAPACITY: CPT | Performed by: INTERNAL MEDICINE

## 2022-08-26 PROCEDURE — G0463 HOSPITAL OUTPT CLINIC VISIT: HCPCS | Mod: 25

## 2022-08-26 NOTE — LETTER
8/26/2022         RE: Ciera Chong  [address]  [facility] [id number]        Dear Colleague,    Thank you for referring your patient, Ciera Chong, to the [facility] [place]. Please see a copy of my visit note below.    Reason for Visit  Ciera Chong is a 62 year old year old female who is being seen for Interstitial Lung Disease (ILD) (ILD titration follow up )    ILD HPI    Ciera Chong is a 62-year-old female today who follows up for abnormal CT scan.  She has had some mild groundglass opacities that have been fairly stable.  She has had significant dyspnea on exertion that is likely multifactorial.  She probably has some element of mild to moderate persistent asthma as well as her morbid obesity and untreated sleep apnea.  At the last visit we talked about using her inhalers and establishing sleep clinic appointment.  She has not seen the sleep clinic yet.  She also notes that she is not really using her inhaler she does note that they seem to make her mouth dry.  She feels that she is very limited in terms of her activity this is partly due to back pain but also due to the fact that she feels she starts to get wheezy when she walks.  Otherwise no other new complaints today.      Current Outpatient Medications   Medication     albuterol (PROAIR HFA/PROVENTIL HFA/VENTOLIN HFA) 108 (90 Base) MCG/ACT inhaler     amLODIPine (NORVASC) 5 MG tablet     azithromycin (ZITHROMAX) 250 MG tablet     azithromycin (ZITHROMAX) 250 MG tablet     buPROPion (WELLBUTRIN SR) 200 MG 12 hr tablet     cetirizine (ZYRTEC) 10 MG tablet     cetirizine (ZYRTEC) 10 MG tablet     cyclobenzaprine (FLEXERIL) 10 MG tablet     diclofenac (VOLTAREN) 1 % topical gel     DULoxetine (CYMBALTA) 30 MG capsule     DULoxetine (CYMBALTA) 60 MG capsule     esomeprazole (NEXIUM) 40 MG DR capsule     fluticasone (FLONASE) 50 MCG/ACT nasal spray     fluticasone-salmeterol (ADVAIR HFA)  230-21 MCG/ACT inhaler     melatonin 5 MG tablet     montelukast (SINGULAIR) 10 MG tablet     Multiple Vitamin (THERA-TABS) TABS     oxyCODONE (OXYCONTIN) 20 MG 12 hr tablet     oxyCODONE-acetaminophen (PERCOCET) 5-325 MG tablet     polyethylene glycol (MIRALAX/GLYCOLAX) powder     pravastatin (PRAVACHOL) 20 MG tablet     predniSONE (DELTASONE) 1 MG tablet     pregabalin (LYRICA) 150 MG capsule     propranolol (INDERAL) 40 MG tablet     sucralfate (CARAFATE) 1 GM/10ML suspension     SUMAtriptan (IMITREX) 50 MG tablet     zolpidem ER (AMBIEN CR) 6.25 MG CR tablet     albuterol (PROVENTIL) (2.5 MG/3ML) 0.083% neb solution     mirtazapine (REMERON) 7.5 MG tablet     No current facility-administered medications for this visit.     Facility-Administered Medications Ordered in Other Visits   Medication     sodium chloride (PF) 0.9% PF flush 10 mL     Allergies   Allergen Reactions     Apremilast Nausea and Vomiting     Sulfamethoxazole-Trimethoprim Hives     Sulfa Drugs Rash     Past Medical History:   Diagnosis Date     Anxiety      Breast cancer (H)      Chronic pain      Gastric ulcer      ILD (interstitial lung disease) (H)      LVH (left ventricular hypertrophy)      Seronegative rheumatoid arthritis (H)        Past Surgical History:   Procedure Laterality Date     APPENDECTOMY  1978     AS REVISE BREAST RECONSTRUCTION  02/2008     BACK SURGERY  02/26/2009    LUMBAR HEMILAMINECTOMY 2/26/09   Right C9-X0-Mqkkvptvidcbynp     BACK SURGERY  03/04/2010    LUMBAR HEMILAMINECTOMY 3/4/10   Left I1-Q0-Rhuxahnjmnfaedg, Decompression     BLADDER SUSPENSION       CARPAL TUNNEL RELEASE RT/LT Right 11/11/2010     HYSTERECTOMY  2006    HYSTERECTOMY 2006   with bilateral salpingoophrectomy     LAPAROSCOPY  1990    LAPAROSCOPY 1990   endometrosis     MASTECTOMY, BILATERAL  03/31/2006    MASTECTOMY 03-31-06   bilateral breast     ROTATOR CUFF REPAIR RT/LT  2011     SPINE SURGERY         Social History     Socioeconomic History      Marital status: Single     Spouse name: Not on file     Number of children: Not on file     Years of education: Not on file     Highest education level: Not on file   Occupational History     Not on file   Tobacco Use     Smoking status: Former Smoker     Packs/day: 0.50     Years: 15.00     Pack years: 7.50     Quit date: 2000     Years since quittin.6     Smokeless tobacco: Never Used   Substance and Sexual Activity     Alcohol use: Not Currently     Drug use: Not Currently     Sexual activity: Not on file   Other Topics Concern     Parent/sibling w/ CABG, MI or angioplasty before 65F 55M? Not Asked   Social History Narrative    No asbestos, sandblasting, welding, TB, spray painting.         Occupation: Not currently working. 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     Pets: She has a dog at home. No birds.     Allergies: No environmental allergies.     Hobbies: No significant outdoor exposures.     Travel: None    Home: Lives in house with . There are some mold issues in the bathroom, which were abated in 2019. No change in breathing since abatement. She is unsure if she uses a down comforter - not using currently. No feather pillow use. No hot tub use, no musical instrument use.      Social Determinants of Health     Financial Resource Strain: Not on file   Food Insecurity: Not on file   Transportation Needs: Not on file   Physical Activity: Not on file   Stress: Not on file   Social Connections: Not on file   Intimate Partner Violence: Not on file   Housing Stability: Not on file       Family History   Problem Relation Age of Onset     Esophageal Cancer Father      Heart Disease Father      Heart Disease Brother      Breast Cancer Sister      Breast Cancer Maternal Aunt         x2 aunts     Ovarian Cancer Maternal Aunt        ROS Pulmonary    A complete ROS was otherwise negative except as noted in the HPI.  Vitals:    22 1422   BP: 120/73   BP Location:  "Right arm   Patient Position: Chair   Pulse: 76   SpO2: 97%   Weight: 114.8 kg (253 lb)   Height: 1.702 m (5' 7\")     Exam:   GENERAL APPEARANCE: Well developed, well nourished, alert, and in no apparent distress.  NECK: supple, no masses, no thyromegaly.  LYMPHATICS: No significant axillary, cervical, or supraclavicular nodes.  RESP: good air flow throughout, - scattered exp wheezes, no crackles  CV: Normal S1, S2, regular rhythm, normal rate, no rub, no murmur,  no gallop, no LE edema.   ABDOMEN:  Bowel sounds normal, soft, nontender, no HSM or masses.   MS: extremities normal- no clubbing, no cyanosis.  PSYCH: mentation appears normal. and affect normal/bright  Results: I have reviewed all imaging, PFTs and other relavent tests, please see below for details, PFT and imaging results were reviewed with the patient.  PFTs: Moderate restriction with moderate duction diffusing capacity.  Stable to slightly improved from previous.    Assessment and plan:    62-year-old female with dyspnea exertion is multifactorial.  I again discussed the importance of the sleep referral and she will try to set up a sleep appointment in [place].  We also discussed the importance of using her inhaler she needs to use her Advair inhaler regularly twice a day and also use her albuterol more frequently.  We did also discussed the importance of weight loss.  I do not think there is evidence that her interstitial lung disease is progressing though we will need to consider reimaging at some point.  At this point I will see the patient back in 6 months with pulmonary function test she will call sooner with any changes in her breathing      CBC   Recent Labs   Lab Test 08/24/21  1339 01/13/20  1758   RBC  --  4.25   HGB 12.8 13.0   HCT  --  39.3   PLT  --  289       Basic Metabolic Panel  No results for input(s): NA, POTASSIUM, CHLORIDE, CO2, BUN, CT, GLC, YE in the last 49054 hours.    INR  No results for input(s): INR in the last 71506 " hours.    PFT  PFT Latest Ref Rng & Units 8/26/2022   FVC L 1.34   FEV1 L 1.05   FVC% % 41   FEV1% % 41     Again, thank you for allowing me to participate in the care of your patient.      Sincerely,    David Morris Perlman, MD

## 2022-08-26 NOTE — PROGRESS NOTES
Reason for Visit  Ciera Chong is a 62 year old year old female who is being seen for Interstitial Lung Disease (ILD) (ILD titration follow up )    ILD HPI    Ciera Chong is a 62-year-old female today who follows up for abnormal CT scan.  She has had some mild groundglass opacities that have been fairly stable.  She has had significant dyspnea on exertion that is likely multifactorial.  She probably has some element of mild to moderate persistent asthma as well as her morbid obesity and untreated sleep apnea.  At the last visit we talked about using her inhalers and establishing sleep clinic appointment.  She has not seen the sleep clinic yet.  She also notes that she is not really using her inhaler she does note that they seem to make her mouth dry.  She feels that she is very limited in terms of her activity this is partly due to back pain but also due to the fact that she feels she starts to get wheezy when she walks.  Otherwise no other new complaints today.      Current Outpatient Medications   Medication     albuterol (PROAIR HFA/PROVENTIL HFA/VENTOLIN HFA) 108 (90 Base) MCG/ACT inhaler     amLODIPine (NORVASC) 5 MG tablet     azithromycin (ZITHROMAX) 250 MG tablet     azithromycin (ZITHROMAX) 250 MG tablet     buPROPion (WELLBUTRIN SR) 200 MG 12 hr tablet     cetirizine (ZYRTEC) 10 MG tablet     cetirizine (ZYRTEC) 10 MG tablet     cyclobenzaprine (FLEXERIL) 10 MG tablet     diclofenac (VOLTAREN) 1 % topical gel     DULoxetine (CYMBALTA) 30 MG capsule     DULoxetine (CYMBALTA) 60 MG capsule     esomeprazole (NEXIUM) 40 MG DR capsule     fluticasone (FLONASE) 50 MCG/ACT nasal spray     fluticasone-salmeterol (ADVAIR HFA) 230-21 MCG/ACT inhaler     melatonin 5 MG tablet     montelukast (SINGULAIR) 10 MG tablet     Multiple Vitamin (THERA-TABS) TABS     oxyCODONE (OXYCONTIN) 20 MG 12 hr tablet     oxyCODONE-acetaminophen (PERCOCET) 5-325 MG tablet     polyethylene glycol (MIRALAX/GLYCOLAX) powder      pravastatin (PRAVACHOL) 20 MG tablet     predniSONE (DELTASONE) 1 MG tablet     pregabalin (LYRICA) 150 MG capsule     propranolol (INDERAL) 40 MG tablet     sucralfate (CARAFATE) 1 GM/10ML suspension     SUMAtriptan (IMITREX) 50 MG tablet     zolpidem ER (AMBIEN CR) 6.25 MG CR tablet     albuterol (PROVENTIL) (2.5 MG/3ML) 0.083% neb solution     mirtazapine (REMERON) 7.5 MG tablet     No current facility-administered medications for this visit.     Facility-Administered Medications Ordered in Other Visits   Medication     sodium chloride (PF) 0.9% PF flush 10 mL     Allergies   Allergen Reactions     Apremilast Nausea and Vomiting     Sulfamethoxazole-Trimethoprim Hives     Sulfa Drugs Rash     Past Medical History:   Diagnosis Date     Anxiety      Breast cancer (H)      Chronic pain      Gastric ulcer      ILD (interstitial lung disease) (H)      LVH (left ventricular hypertrophy)      Seronegative rheumatoid arthritis (H)        Past Surgical History:   Procedure Laterality Date     APPENDECTOMY  1978     AS REVISE BREAST RECONSTRUCTION  02/2008     BACK SURGERY  02/26/2009    LUMBAR HEMILAMINECTOMY 2/26/09   Right Y0-A7-Mkblwusmknaxrpu     BACK SURGERY  03/04/2010    LUMBAR HEMILAMINECTOMY 3/4/10   Left F2-B3-Ibsrrbsclhjcxvq, Decompression     BLADDER SUSPENSION       CARPAL TUNNEL RELEASE RT/LT Right 11/11/2010     HYSTERECTOMY  2006    HYSTERECTOMY 2006   with bilateral salpingoophrectomy     LAPAROSCOPY  1990    LAPAROSCOPY 1990   endometrosis     MASTECTOMY, BILATERAL  03/31/2006    MASTECTOMY 03-31-06   bilateral breast     ROTATOR CUFF REPAIR RT/LT  2011     SPINE SURGERY         Social History     Socioeconomic History     Marital status: Single     Spouse name: Not on file     Number of children: Not on file     Years of education: Not on file     Highest education level: Not on file   Occupational History     Not on file   Tobacco Use     Smoking status: Former Smoker     Packs/day: 0.50     Years:  "15.00     Pack years: 7.50     Quit date: 2000     Years since quittin.6     Smokeless tobacco: Never Used   Substance and Sexual Activity     Alcohol use: Not Currently     Drug use: Not Currently     Sexual activity: Not on file   Other Topics Concern     Parent/sibling w/ CABG, MI or angioplasty before 65F 55M? Not Asked   Social History Narrative    No asbestos, sandblasting, welding, TB, spray painting.         Occupation: Not currently working. 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     Pets: She has a dog at home. No birds.     Allergies: No environmental allergies.     Hobbies: No significant outdoor exposures.     Travel: None    Home: Lives in house with . There are some mold issues in the bathroom, which were abated in 2019. No change in breathing since abatement. She is unsure if she uses a down comforter - not using currently. No feather pillow use. No hot tub use, no musical instrument use.      Social Determinants of Health     Financial Resource Strain: Not on file   Food Insecurity: Not on file   Transportation Needs: Not on file   Physical Activity: Not on file   Stress: Not on file   Social Connections: Not on file   Intimate Partner Violence: Not on file   Housing Stability: Not on file       Family History   Problem Relation Age of Onset     Esophageal Cancer Father      Heart Disease Father      Heart Disease Brother      Breast Cancer Sister      Breast Cancer Maternal Aunt         x2 aunts     Ovarian Cancer Maternal Aunt        ROS Pulmonary    A complete ROS was otherwise negative except as noted in the HPI.  Vitals:    22 1422   BP: 120/73   BP Location: Right arm   Patient Position: Chair   Pulse: 76   SpO2: 97%   Weight: 114.8 kg (253 lb)   Height: 1.702 m (5' 7\")     Exam:   GENERAL APPEARANCE: Well developed, well nourished, alert, and in no apparent distress.  NECK: supple, no masses, no thyromegaly.  LYMPHATICS: No significant " axillary, cervical, or supraclavicular nodes.  RESP: good air flow throughout, - scattered exp wheezes, no crackles  CV: Normal S1, S2, regular rhythm, normal rate, no rub, no murmur,  no gallop, no LE edema.   ABDOMEN:  Bowel sounds normal, soft, nontender, no HSM or masses.   MS: extremities normal- no clubbing, no cyanosis.  PSYCH: mentation appears normal. and affect normal/bright  Results: I have reviewed all imaging, PFTs and other relavent tests, please see below for details, PFT and imaging results were reviewed with the patient.  PFTs: Moderate restriction with moderate duction diffusing capacity.  Stable to slightly improved from previous.    Assessment and plan:    62-year-old female with dyspnea exertion is multifactorial.  I again discussed the importance of the sleep referral and she will try to set up a sleep appointment in [place].  We also discussed the importance of using her inhaler she needs to use her Advair inhaler regularly twice a day and also use her albuterol more frequently.  We did also discussed the importance of weight loss.  I do not think there is evidence that her interstitial lung disease is progressing though we will need to consider reimaging at some point.  At this point I will see the patient back in 6 months with pulmonary function test she will call sooner with any changes in her breathing      CBC   Recent Labs   Lab Test 08/24/21  1339 01/13/20  1758   RBC  --  4.25   HGB 12.8 13.0   HCT  --  39.3   PLT  --  289       Basic Metabolic Panel  No results for input(s): NA, POTASSIUM, CHLORIDE, CO2, BUN, CT, GLC, YE in the last 82860 hours.    INR  No results for input(s): INR in the last 79326 hours.    PFT  PFT Latest Ref Rng & Units 8/26/2022   FVC L 1.34   FEV1 L 1.05   FVC% % 41   FEV1% % 41           CC:

## 2022-08-26 NOTE — NURSING NOTE
Chief Complaint   Patient presents with     Interstitial Lung Disease (ILD)     ILD titration follow up      Vitals were taken and medications were reconciled.     Monalisa Riggs RMA  2:28 PM

## 2022-09-09 ENCOUNTER — TELEPHONE (OUTPATIENT)
Dept: GASTROENTEROLOGY | Facility: CLINIC | Age: 62
End: 2022-09-09

## 2022-09-09 NOTE — TELEPHONE ENCOUNTER
LVM that 9/16 appt needs to be a virtual visit. Claudia unavailable for in-person visit that date. Left k pod number if pt wants to reschedule to a different date.

## 2022-09-13 ENCOUNTER — TELEPHONE (OUTPATIENT)
Dept: GASTROENTEROLOGY | Facility: CLINIC | Age: 62
End: 2022-09-13

## 2022-09-13 NOTE — TELEPHONE ENCOUNTER
Called to remind patient of their upcoming appointment with our GI clinic, on Friday 9/16/2022 at 2:00PM with Kayce Fritz. This appointment is scheduled as a telephone visit. You will receive a call approximately 30 minutes prior to check you in, you must be in [place] for this visit., if your appointment is virtual (video or telephone) you need to be in [place] for the visit. To reschedule or cancel patient to call [phone].    Ankita Balderas MA

## 2022-12-01 DIAGNOSIS — J84.89 NSIP (NONSPECIFIC INTERSTITIAL PNEUMONIA) (H): ICD-10-CM

## 2022-12-01 DIAGNOSIS — R09.02 HYPOXIA: ICD-10-CM

## 2022-12-01 DIAGNOSIS — J98.4 SMALL AIRWAYS DISEASE: ICD-10-CM

## 2022-12-01 RX ORDER — AZITHROMYCIN 250 MG/1
250 TABLET, FILM COATED ORAL DAILY
Qty: 90 TABLET | Refills: 3 | Status: SHIPPED | OUTPATIENT
Start: 2022-12-01 | End: 2023-12-05

## 2022-12-26 DIAGNOSIS — J84.89 NSIP (NONSPECIFIC INTERSTITIAL PNEUMONIA) (H): ICD-10-CM

## 2022-12-26 DIAGNOSIS — J98.4 SMALL AIRWAYS DISEASE: ICD-10-CM

## 2022-12-26 DIAGNOSIS — R09.02 HYPOXIA: ICD-10-CM

## 2022-12-26 RX ORDER — ALBUTEROL SULFATE 0.83 MG/ML
2.5 SOLUTION RESPIRATORY (INHALATION) EVERY 6 HOURS PRN
Qty: 300 ML | Refills: 1 | Status: SHIPPED | OUTPATIENT
Start: 2022-12-26

## 2023-01-16 DIAGNOSIS — R09.02 HYPOXIA: ICD-10-CM

## 2023-01-16 DIAGNOSIS — J84.89 NSIP (NONSPECIFIC INTERSTITIAL PNEUMONIA) (H): ICD-10-CM

## 2023-01-16 DIAGNOSIS — J98.4 SMALL AIRWAYS DISEASE: ICD-10-CM

## 2023-01-16 RX ORDER — MONTELUKAST SODIUM 10 MG/1
10 TABLET ORAL EVERY EVENING
Qty: 90 TABLET | Refills: 1 | Status: SHIPPED | OUTPATIENT
Start: 2023-01-16 | End: 2023-06-19

## 2023-02-23 ENCOUNTER — VIRTUAL VISIT (OUTPATIENT)
Dept: PULMONOLOGY | Facility: CLINIC | Age: 63
End: 2023-02-23
Attending: INTERNAL MEDICINE
Payer: COMMERCIAL

## 2023-02-23 DIAGNOSIS — J84.9 ILD (INTERSTITIAL LUNG DISEASE) (H): Primary | ICD-10-CM

## 2023-02-23 PROCEDURE — 99213 OFFICE O/P EST LOW 20 MIN: CPT | Mod: 93 | Performed by: INTERNAL MEDICINE

## 2023-02-23 NOTE — NURSING NOTE
Is the patient currently in the state of [place]? YES    Visit mode:TELEPHONE    If the visit is dropped, the patient can be reconnected by: TELEPHONE VISIT: Phone number: [phone]    Will anyone else be joining the visit? NO      How would you like to obtain your AVS? Mail a copy    Are changes needed to the allergy or medication list? NO    Reason for visit:     GITA GERARDO

## 2023-02-23 NOTE — LETTER
2/23/2023         RE: Ciera Chong  [address]  [facility] [address]        Dear Colleague,    Thank you for referring your patient, Ciera Chong, to the [facility] FOR LUNG SCIENCE AND [facility] CLINIC [place]. Please see a copy of my visit note below.    GITA RILEY DANIE      Reason for Visit  Ciera Chong is a 62 year old year old female who is being seen for dyspnea  ILD HPI    Ciera Chong is a 62-year-old female with underlying autoimmune disease and small airways disease with some linear fibrotic changes on her CT scan that are not progressive.  She has had significant issues with shortness of breath and dyspnea on exertion.  I think this is multifactorial due to her untreated sleep apnea morbid obesity and also due to her small airways disease.  In talking with the patient today she continues to have symptoms she is only using her Advair inhaler fairly infrequently 2-3 times a week.  Same with her albuterol inhaler.  She has not been seen by sleep physician since our last visit.  She does also note some sinus drainage and she does take Flonase which she feels helps her.  Also feels that there is some congestion there.  Overall no significant changes in her symptoms compared to previous.      Current Outpatient Medications   Medication     albuterol (PROAIR HFA/PROVENTIL HFA/VENTOLIN HFA) 108 (90 Base) MCG/ACT inhaler     albuterol (PROVENTIL) (2.5 MG/3ML) 0.083% neb solution     amLODIPine (NORVASC) 5 MG tablet     azithromycin (ZITHROMAX) 250 MG tablet     azithromycin (ZITHROMAX) 250 MG tablet     buPROPion (WELLBUTRIN SR) 200 MG 12 hr tablet     cetirizine (ZYRTEC) 10 MG tablet     cetirizine (ZYRTEC) 10 MG tablet     cyclobenzaprine (FLEXERIL) 10 MG tablet     diclofenac (VOLTAREN) 1 % topical gel     DULoxetine (CYMBALTA) 30 MG capsule     DULoxetine (CYMBALTA) 60 MG capsule     esomeprazole (NEXIUM) 40 MG DR capsule     fluticasone (FLONASE) 50 MCG/ACT nasal spray      fluticasone-salmeterol (ADVAIR HFA) 230-21 MCG/ACT inhaler     melatonin 5 MG tablet     Multiple Vitamin (THERA-TABS) TABS     oxyCODONE (OXYCONTIN) 20 MG 12 hr tablet     oxyCODONE-acetaminophen (PERCOCET) 5-325 MG tablet     polyethylene glycol (MIRALAX/GLYCOLAX) powder     pravastatin (PRAVACHOL) 20 MG tablet     predniSONE (DELTASONE) 1 MG tablet     pregabalin (LYRICA) 150 MG capsule     propranolol (INDERAL) 40 MG tablet     sucralfate (CARAFATE) 1 GM/10ML suspension     SUMAtriptan (IMITREX) 50 MG tablet     zolpidem ER (AMBIEN CR) 6.25 MG CR tablet     mirtazapine (REMERON) 7.5 MG tablet     montelukast (SINGULAIR) 10 MG tablet     No current facility-administered medications for this visit.     Facility-Administered Medications Ordered in Other Visits   Medication     sodium chloride (PF) 0.9% PF flush 10 mL     Allergies   Allergen Reactions     Apremilast Nausea and Vomiting     Sulfamethoxazole-Trimethoprim Hives     Sulfa Drugs Rash     Past Medical History:   Diagnosis Date     Anxiety      Breast cancer (H)      Chronic pain      Gastric ulcer      ILD (interstitial lung disease) (H)      LVH (left ventricular hypertrophy)      Seronegative rheumatoid arthritis (H)        Past Surgical History:   Procedure Laterality Date     APPENDECTOMY  1978     AS REVISE BREAST RECONSTRUCTION  02/2008     BACK SURGERY  02/26/2009    LUMBAR HEMILAMINECTOMY 2/26/09   Right O9-O9-Apdtnjdxtvrrnqg     BACK SURGERY  03/04/2010    LUMBAR HEMILAMINECTOMY 3/4/10   Left C1-I6-Mbugstufczrssvi, Decompression     BLADDER SUSPENSION       CARPAL TUNNEL RELEASE RT/LT Right 11/11/2010     HYSTERECTOMY  2006    HYSTERECTOMY 2006   with bilateral salpingoophrectomy     LAPAROSCOPY  1990    LAPAROSCOPY 1990   endometrosis     MASTECTOMY, BILATERAL  03/31/2006    MASTECTOMY 03-31-06   bilateral breast     ROTATOR CUFF REPAIR RT/LT  2011     SPINE SURGERY         Social History     Socioeconomic History     Marital status: Single      Spouse name: Not on file     Number of children: Not on file     Years of education: Not on file     Highest education level: Not on file   Occupational History     Not on file   Tobacco Use     Smoking status: Former     Packs/day: 0.50     Years: 15.00     Pack years: 7.50     Types: Cigarettes     Quit date: 2000     Years since quittin.1     Smokeless tobacco: Never   Substance and Sexual Activity     Alcohol use: Not Currently     Drug use: Not Currently     Sexual activity: Not on file   Other Topics Concern     Parent/sibling w/ CABG, MI or angioplasty before 65F 55M? Not Asked   Social History Narrative    No asbestos, sandblasting, welding, TB, spray painting.         Occupation: Not currently working. Used to work in a [employer]- no known respiratory irritant exposures.     Pets: She has a dog at home. No birds.     Allergies: No environmental allergies.     Hobbies: No significant outdoor exposures.     Travel: None    Home: Lives in house with . There are some mold issues in the bathroom, which were abated in 2019. No change in breathing since abatement. She is unsure if she uses a down comforter - not using currently. No feather pillow use. No hot tub use, no musical instrument use.      Social Determinants of Health     Financial Resource Strain: Not on file   Food Insecurity: Not on file   Transportation Needs: Not on file   Physical Activity: Not on file   Stress: Not on file   Social Connections: Not on file   Intimate Partner Violence: Not on file   Housing Stability: Not on file       Family History   Problem Relation Age of Onset     Esophageal Cancer Father      Heart Disease Father      Heart Disease Brother      Breast Cancer Sister      Breast Cancer Maternal Aunt         x2 aunts     Ovarian Cancer Maternal Aunt        ROS Pulmonary    A complete ROS was otherwise negative except as noted in the HPI.    Results: I have reviewed all imaging, PFTs and other relavent tests,  please see below for details, PFT and imaging results were reviewed with the patient.    Assessment and plan:    62-year-old female with small airways disease in the setting of underlying autoimmune disease.  We did not get pulmonary function test today due to the inclement weather.  Overall I think she is fairly stable so I will see her back in 3 months and we will get pulmonary function test at this time.  We will refer her to a local sleep physician to have an assessment for sleep apnea.  I also have again encouraged her to use her inhalers more regularly if she is noting increased dry mouth with increased use of the Advair then we can consider a different formulation or possibly even switching her to long-acting nebs.  Otherwise would not make any other changes.  I will see her in 3 months with PFTs.          CBC   Recent Labs   Lab Test 08/24/21  1339 01/13/20  1758   RBC  --  4.25   HGB 12.8 13.0   HCT  --  39.3   PLT  --  289       Basic Metabolic Panel  No results for input(s): NA, POTASSIUM, CHLORIDE, CO2, BUN, CT, GLC, YE in the last 37218 hours.    INR  No results for input(s): INR in the last 64290 hours.    PFT  PFT Latest Ref Rng & Units 8/26/2022   FVC L 1.34   FEV1 L 1.05   FVC% % 41   FEV1% % 41         Again, thank you for allowing me to participate in the care of your patient.      Sincerely,    David Morris Perlman, MD

## 2023-02-23 NOTE — PROGRESS NOTES
Phone Visit Details    Type of service:  Phone Visit    Call Start Time (time video started): 2:36 PM    Call End Time (time video stopped): 2:44 PM    Originating Location (pt. Location): Home    Distant Location (provider location):  Off-site    Mode of Communication:  Telephone    GITA GERARDO      Reason for Visit  Ciera Chong is a 62 year old year old female who is being seen for dyspnea  ILD HPI    Ciera Chong is a 62-year-old female with underlying autoimmune disease and small airways disease with some linear fibrotic changes on her CT scan that are not progressive.  She has had significant issues with shortness of breath and dyspnea on exertion.  I think this is multifactorial due to her untreated sleep apnea morbid obesity and also due to her small airways disease.  In talking with the patient today she continues to have symptoms she is only using her Advair inhaler fairly infrequently 2-3 times a week.  Same with her albuterol inhaler.  She has not been seen by sleep physician since our last visit.  She does also note some sinus drainage and she does take Flonase which she feels helps her.  Also feels that there is some congestion there.  Overall no significant changes in her symptoms compared to previous.      Current Outpatient Medications   Medication     albuterol (PROAIR HFA/PROVENTIL HFA/VENTOLIN HFA) 108 (90 Base) MCG/ACT inhaler     albuterol (PROVENTIL) (2.5 MG/3ML) 0.083% neb solution     amLODIPine (NORVASC) 5 MG tablet     azithromycin (ZITHROMAX) 250 MG tablet     azithromycin (ZITHROMAX) 250 MG tablet     buPROPion (WELLBUTRIN SR) 200 MG 12 hr tablet     cetirizine (ZYRTEC) 10 MG tablet     cetirizine (ZYRTEC) 10 MG tablet     cyclobenzaprine (FLEXERIL) 10 MG tablet     diclofenac (VOLTAREN) 1 % topical gel     DULoxetine (CYMBALTA) 30 MG capsule     DULoxetine (CYMBALTA) 60 MG capsule     esomeprazole (NEXIUM) 40 MG DR capsule     fluticasone (FLONASE) 50 MCG/ACT nasal spray      fluticasone-salmeterol (ADVAIR HFA) 230-21 MCG/ACT inhaler     melatonin 5 MG tablet     Multiple Vitamin (THERA-TABS) TABS     oxyCODONE (OXYCONTIN) 20 MG 12 hr tablet     oxyCODONE-acetaminophen (PERCOCET) 5-325 MG tablet     polyethylene glycol (MIRALAX/GLYCOLAX) powder     pravastatin (PRAVACHOL) 20 MG tablet     predniSONE (DELTASONE) 1 MG tablet     pregabalin (LYRICA) 150 MG capsule     propranolol (INDERAL) 40 MG tablet     sucralfate (CARAFATE) 1 GM/10ML suspension     SUMAtriptan (IMITREX) 50 MG tablet     zolpidem ER (AMBIEN CR) 6.25 MG CR tablet     mirtazapine (REMERON) 7.5 MG tablet     montelukast (SINGULAIR) 10 MG tablet     No current facility-administered medications for this visit.     Facility-Administered Medications Ordered in Other Visits   Medication     sodium chloride (PF) 0.9% PF flush 10 mL     Allergies   Allergen Reactions     Apremilast Nausea and Vomiting     Sulfamethoxazole-Trimethoprim Hives     Sulfa Drugs Rash     Past Medical History:   Diagnosis Date     Anxiety      Breast cancer (H)      Chronic pain      Gastric ulcer      ILD (interstitial lung disease) (H)      LVH (left ventricular hypertrophy)      Seronegative rheumatoid arthritis (H)        Past Surgical History:   Procedure Laterality Date     APPENDECTOMY  1978     AS REVISE BREAST RECONSTRUCTION  02/2008     BACK SURGERY  02/26/2009    LUMBAR HEMILAMINECTOMY 2/26/09   Right J8-M6-Nwbgarrepkzxfrg     BACK SURGERY  03/04/2010    LUMBAR HEMILAMINECTOMY 3/4/10   Left R9-J1-Pnvpaunbkjvyeet, Decompression     BLADDER SUSPENSION       CARPAL TUNNEL RELEASE RT/LT Right 11/11/2010     HYSTERECTOMY  2006    HYSTERECTOMY 2006   with bilateral salpingoophrectomy     LAPAROSCOPY  1990    LAPAROSCOPY 1990   endometrosis     MASTECTOMY, BILATERAL  03/31/2006    MASTECTOMY 03-31-06   bilateral breast     ROTATOR CUFF REPAIR RT/LT  2011     SPINE SURGERY         Social History     Socioeconomic History     Marital status: Single      Spouse name: Not on file     Number of children: Not on file     Years of education: Not on file     Highest education level: Not on file   Occupational History     Not on file   Tobacco Use     Smoking status: Former     Packs/day: 0.50     Years: 15.00     Pack years: 7.50     Types: Cigarettes     Quit date: 2000     Years since quittin.1     Smokeless tobacco: Never   Substance and Sexual Activity     Alcohol use: Not Currently     Drug use: Not Currently     Sexual activity: Not on file   Other Topics Concern     Parent/sibling w/ CABG, MI or angioplasty before 65F 55M? Not Asked   Social History Narrative    No asbestos, sandblasting, welding, TB, spray painting.         Occupation: Not currently working. Used to work in a [employer]- no known respiratory irritant exposures.     Pets: She has a dog at home. No birds.     Allergies: No environmental allergies.     Hobbies: No significant outdoor exposures.     Travel: None    Home: Lives in house with . There are some mold issues in the bathroom, which were abated in 2019. No change in breathing since abatement. She is unsure if she uses a down comforter - not using currently. No feather pillow use. No hot tub use, no musical instrument use.      Social Determinants of Health     Financial Resource Strain: Not on file   Food Insecurity: Not on file   Transportation Needs: Not on file   Physical Activity: Not on file   Stress: Not on file   Social Connections: Not on file   Intimate Partner Violence: Not on file   Housing Stability: Not on file       Family History   Problem Relation Age of Onset     Esophageal Cancer Father      Heart Disease Father      Heart Disease Brother      Breast Cancer Sister      Breast Cancer Maternal Aunt         x2 aunts     Ovarian Cancer Maternal Aunt        ROS Pulmonary    A complete ROS was otherwise negative except as noted in the HPI.    Results: I have reviewed all imaging, PFTs and other relavent tests,  please see below for details, PFT and imaging results were reviewed with the patient.    Assessment and plan:    62-year-old female with small airways disease in the setting of underlying autoimmune disease.  We did not get pulmonary function test today due to the inclement weather.  Overall I think she is fairly stable so I will see her back in 3 months and we will get pulmonary function test at this time.  We will refer her to a local sleep physician to have an assessment for sleep apnea.  I also have again encouraged her to use her inhalers more regularly if she is noting increased dry mouth with increased use of the Advair then we can consider a different formulation or possibly even switching her to long-acting nebs.  Otherwise would not make any other changes.  I will see her in 3 months with PFTs.          CBC   Recent Labs   Lab Test 08/24/21  1339 01/13/20  1758   RBC  --  4.25   HGB 12.8 13.0   HCT  --  39.3   PLT  --  289       Basic Metabolic Panel  No results for input(s): NA, POTASSIUM, CHLORIDE, CO2, BUN, CT, GLC, YE in the last 71665 hours.    INR  No results for input(s): INR in the last 75096 hours.    PFT  PFT Latest Ref Rng & Units 8/26/2022   FVC L 1.34   FEV1 L 1.05   FVC% % 41   FEV1% % 41           CC:

## 2023-02-27 ENCOUNTER — TELEPHONE (OUTPATIENT)
Dept: PULMONOLOGY | Facility: CLINIC | Age: 63
End: 2023-02-27
Payer: COMMERCIAL

## 2023-02-27 DIAGNOSIS — R29.818 SUSPECTED SLEEP APNEA: Primary | ICD-10-CM

## 2023-03-02 ENCOUNTER — TELEPHONE (OUTPATIENT)
Dept: PULMONOLOGY | Facility: CLINIC | Age: 63
End: 2023-03-02
Payer: COMMERCIAL

## 2023-03-02 NOTE — TELEPHONE ENCOUNTER
LVM for the patient to call back and schedule the following:    Appointment type: ILD RTN  Provider: Perlman  Return date: May/June 2023  Specialty phone number: [phone]  Additional appointment(s) needed: PFT dlco/loop & 6MW  Additonal Notes: n/a

## 2023-03-06 ENCOUNTER — TELEPHONE (OUTPATIENT)
Dept: PULMONOLOGY | Facility: CLINIC | Age: 63
End: 2023-03-06
Payer: COMMERCIAL

## 2023-03-06 NOTE — TELEPHONE ENCOUNTER
LVM 2x for the patient to call back and schedule the following:    Appointment type: ILD RTN  Provider: Perlman  Return date: 5/2023  Specialty phone number: [phone]  Additional appointment(s) needed: 6MW PFT  Additonal Notes: mailed follow up letter

## 2023-06-18 DIAGNOSIS — J98.4 SMALL AIRWAYS DISEASE: ICD-10-CM

## 2023-06-18 DIAGNOSIS — J84.89 NSIP (NONSPECIFIC INTERSTITIAL PNEUMONIA) (H): ICD-10-CM

## 2023-06-18 DIAGNOSIS — R09.02 HYPOXIA: ICD-10-CM

## 2023-06-19 RX ORDER — MONTELUKAST SODIUM 10 MG/1
TABLET ORAL
Qty: 90 TABLET | Refills: 0 | Status: SHIPPED | OUTPATIENT
Start: 2023-06-19 | End: 2023-09-13

## 2023-09-13 DIAGNOSIS — R09.02 HYPOXIA: ICD-10-CM

## 2023-09-13 DIAGNOSIS — J84.89 NSIP (NONSPECIFIC INTERSTITIAL PNEUMONIA) (H): ICD-10-CM

## 2023-09-13 DIAGNOSIS — J98.4 SMALL AIRWAYS DISEASE: ICD-10-CM

## 2023-09-13 RX ORDER — MONTELUKAST SODIUM 10 MG/1
TABLET ORAL
Qty: 90 TABLET | Refills: 0 | Status: SHIPPED | OUTPATIENT
Start: 2023-09-13 | End: 2023-12-22

## 2023-12-05 DIAGNOSIS — J84.89 NSIP (NONSPECIFIC INTERSTITIAL PNEUMONIA) (H): ICD-10-CM

## 2023-12-05 DIAGNOSIS — J98.4 SMALL AIRWAYS DISEASE: ICD-10-CM

## 2023-12-05 DIAGNOSIS — R09.02 HYPOXIA: ICD-10-CM

## 2023-12-05 RX ORDER — AZITHROMYCIN 250 MG/1
250 TABLET, FILM COATED ORAL DAILY
Qty: 30 TABLET | Refills: 0 | Status: SHIPPED | OUTPATIENT
Start: 2023-12-05 | End: 2024-01-22

## 2023-12-22 DIAGNOSIS — R09.02 HYPOXIA: ICD-10-CM

## 2023-12-22 DIAGNOSIS — J84.89 NSIP (NONSPECIFIC INTERSTITIAL PNEUMONIA) (H): ICD-10-CM

## 2023-12-22 DIAGNOSIS — J98.4 SMALL AIRWAYS DISEASE: ICD-10-CM

## 2023-12-22 RX ORDER — MONTELUKAST SODIUM 10 MG/1
1 TABLET ORAL EVERY EVENING
Qty: 90 TABLET | Refills: 0 | Status: SHIPPED | OUTPATIENT
Start: 2023-12-22 | End: 2024-03-04

## 2024-01-19 DIAGNOSIS — J84.89 NSIP (NONSPECIFIC INTERSTITIAL PNEUMONIA) (H): ICD-10-CM

## 2024-01-19 DIAGNOSIS — R09.02 HYPOXIA: ICD-10-CM

## 2024-01-19 DIAGNOSIS — J98.4 SMALL AIRWAYS DISEASE: ICD-10-CM

## 2024-01-22 RX ORDER — AZITHROMYCIN 250 MG/1
250 TABLET, FILM COATED ORAL DAILY
Qty: 90 TABLET | Refills: 0 | Status: SHIPPED | OUTPATIENT
Start: 2024-01-22 | End: 2024-04-24

## 2024-03-04 DIAGNOSIS — R09.02 HYPOXIA: ICD-10-CM

## 2024-03-04 DIAGNOSIS — J84.89 NSIP (NONSPECIFIC INTERSTITIAL PNEUMONIA) (H): ICD-10-CM

## 2024-03-04 DIAGNOSIS — J98.4 SMALL AIRWAYS DISEASE: ICD-10-CM

## 2024-03-04 RX ORDER — MONTELUKAST SODIUM 10 MG/1
1 TABLET ORAL EVERY EVENING
Qty: 90 TABLET | Refills: 0 | Status: SHIPPED | OUTPATIENT
Start: 2024-03-04 | End: 2024-05-30

## 2024-03-14 ENCOUNTER — OFFICE VISIT (OUTPATIENT)
Dept: PULMONOLOGY | Facility: CLINIC | Age: 64
End: 2024-03-14
Attending: INTERNAL MEDICINE
Payer: COMMERCIAL

## 2024-03-14 VITALS
HEIGHT: 67 IN | WEIGHT: 247 LBS | SYSTOLIC BLOOD PRESSURE: 116 MMHG | HEART RATE: 75 BPM | BODY MASS INDEX: 38.77 KG/M2 | OXYGEN SATURATION: 96 % | DIASTOLIC BLOOD PRESSURE: 75 MMHG

## 2024-03-14 DIAGNOSIS — J84.9 ILD (INTERSTITIAL LUNG DISEASE) (H): ICD-10-CM

## 2024-03-14 LAB
6 MIN WALK (FT): 600 FT
6 MIN WALK (M): 183 M

## 2024-03-14 PROCEDURE — 94618 PULMONARY STRESS TESTING: CPT | Performed by: INTERNAL MEDICINE

## 2024-03-14 PROCEDURE — G0463 HOSPITAL OUTPT CLINIC VISIT: HCPCS | Performed by: INTERNAL MEDICINE

## 2024-03-14 PROCEDURE — 99214 OFFICE O/P EST MOD 30 MIN: CPT | Mod: 25 | Performed by: INTERNAL MEDICINE

## 2024-03-14 PROCEDURE — 94375 RESPIRATORY FLOW VOLUME LOOP: CPT | Performed by: INTERNAL MEDICINE

## 2024-03-14 PROCEDURE — 94729 DIFFUSING CAPACITY: CPT | Performed by: INTERNAL MEDICINE

## 2024-03-14 RX ORDER — ALBUTEROL SULFATE 90 UG/1
1-2 AEROSOL, METERED RESPIRATORY (INHALATION) EVERY 4 HOURS PRN
Qty: 18 G | Refills: 11 | Status: SHIPPED | OUTPATIENT
Start: 2024-03-14

## 2024-03-14 ASSESSMENT — PAIN SCALES - GENERAL: PAINLEVEL: NO PAIN (0)

## 2024-03-14 NOTE — LETTER
3/14/2024         RE: Ciera Chong  [address]  [facility] [address]        Dear Colleague,    Thank you for referring your patient, Ciera Chong, to the [facility] FOR LUNG SCIENCE AND [facility] CLIN[facility] [place]. Please see a copy of my visit note below.    Reason for Visit  Ciera Chong is a 63 year old year old female who is being seen for Interstitial Lung Disease (ILD) (ILD follow up )    ILD HPI    Ciera Chong is a 63-year-old female with mild interstitial lung abnormalities in the setting of underlying autoimmune disease.  She has a diagnosis of RA versus psoriatic arthritis.  She has had fairly minimal stable disease and we have just been following her.  She does have chronic oxygen use at 3 L.  She returns clinic today overall stating that she is doing relatively well it has been quite sometime since we have seen her in person and she has not had pulmonary function test done since August 2022.  Overall from a respiratory standpoint she feels she is stable she is using the oxygen 3 L mainly at night and also sometimes with exertion during the day.  Otherwise she does not feel there has been any significant progression of her respiratory symptoms.      Current Outpatient Medications   Medication     albuterol (PROAIR HFA/PROVENTIL HFA/VENTOLIN HFA) 108 (90 Base) MCG/ACT inhaler     albuterol (PROVENTIL) (2.5 MG/3ML) 0.083% neb solution     amLODIPine (NORVASC) 5 MG tablet     azithromycin (ZITHROMAX) 250 MG tablet     azithromycin (ZITHROMAX) 250 MG tablet     buPROPion (WELLBUTRIN SR) 200 MG 12 hr tablet     cetirizine (ZYRTEC) 10 MG tablet     cetirizine (ZYRTEC) 10 MG tablet     cyclobenzaprine (FLEXERIL) 10 MG tablet     diclofenac (VOLTAREN) 1 % topical gel     DULoxetine (CYMBALTA) 30 MG capsule     DULoxetine (CYMBALTA) 60 MG capsule     esomeprazole (NEXIUM) 40 MG DR capsule     fluticasone (FLONASE) 50 MCG/ACT nasal spray     fluticasone-salmeterol (ADVAIR HFA)  230-21 MCG/ACT inhaler     melatonin 5 MG tablet     montelukast (SINGULAIR) 10 MG tablet     Multiple Vitamin (THERA-TABS) TABS     oxyCODONE (OXYCONTIN) 20 MG 12 hr tablet     oxyCODONE-acetaminophen (PERCOCET) 5-325 MG tablet     polyethylene glycol (MIRALAX/GLYCOLAX) powder     pravastatin (PRAVACHOL) 20 MG tablet     predniSONE (DELTASONE) 1 MG tablet     pregabalin (LYRICA) 150 MG capsule     propranolol (INDERAL) 40 MG tablet     sucralfate (CARAFATE) 1 GM/10ML suspension     SUMAtriptan (IMITREX) 50 MG tablet     zolpidem ER (AMBIEN CR) 6.25 MG CR tablet     mirtazapine (REMERON) 7.5 MG tablet     No current facility-administered medications for this visit.     Facility-Administered Medications Ordered in Other Visits   Medication     sodium chloride (PF) 0.9% PF flush 10 mL     Allergies   Allergen Reactions     Apremilast Nausea and Vomiting     Sulfamethoxazole-Trimethoprim Hives     Sulfa Antibiotics Rash     Past Medical History:   Diagnosis Date     Anxiety      Breast cancer (H)      Chronic pain      Gastric ulcer      ILD (interstitial lung disease) (H)      LVH (left ventricular hypertrophy)      Seronegative rheumatoid arthritis (H)        Past Surgical History:   Procedure Laterality Date     APPENDECTOMY  1978     AS REVISE BREAST RECONSTRUCTION  02/2008     BACK SURGERY  02/26/2009    LUMBAR HEMILAMINECTOMY 2/26/09   Right G0-F4-Tnolkdtxjhhoszt     BACK SURGERY  03/04/2010    LUMBAR HEMILAMINECTOMY 3/4/10   Left H1-H2-Fjzpygrbnbzcovp, Decompression     BLADDER SUSPENSION       CARPAL TUNNEL RELEASE RT/LT Right 11/11/2010     HYSTERECTOMY  2006    HYSTERECTOMY 2006   with bilateral salpingoophrectomy     LAPAROSCOPY  1990    LAPAROSCOPY 1990   endometrosis     MASTECTOMY, BILATERAL  03/31/2006    MASTECTOMY 03-31-06   bilateral breast     ROTATOR CUFF REPAIR RT/LT  2011     SPINE SURGERY         Social History     Socioeconomic History     Marital status: Single     Spouse name: Not on file      Number of children: Not on file     Years of education: Not on file     Highest education level: Not on file   Occupational History     Not on file   Tobacco Use     Smoking status: Former     Packs/day: 0.50     Years: 15.00     Additional pack years: 0.00     Total pack years: 7.50     Types: Cigarettes     Quit date: 2000     Years since quittin.2     Smokeless tobacco: Never   Substance and Sexual Activity     Alcohol use: Not Currently     Drug use: Not Currently     Sexual activity: Not on file   Other Topics Concern     Parent/sibling w/ CABG, MI or angioplasty before 65F 55M? Not Asked   Social History Narrative    No asbestos, sandblasting, welding, TB, spray painting.         Occupation: Not currently working. 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     Pets: She has a dog at home. No birds.     Allergies: No environmental allergies.     Hobbies: No significant outdoor exposures.     Travel: None    Home: Lives in house with . There are some mold issues in the bathroom, which were abated in 2019. No change in breathing since abatement. She is unsure if she uses a down comforter - not using currently. No feather pillow use. No hot tub use, no musical instrument use.      Social Determinants of Health     Financial Resource Strain: Not on file   Food Insecurity: Not on file   Transportation Needs: Not on file   Physical Activity: Not on file   Stress: Not on file   Social Connections: Not on file   Interpersonal Safety: Not on file   Housing Stability: Not on file       Family History   Problem Relation Age of Onset     Esophageal Cancer Father      Heart Disease Father      Heart Disease Brother      Breast Cancer Sister      Breast Cancer Maternal Aunt         x2 aunts     Ovarian Cancer Maternal Aunt        ROS Pulmonary    A complete ROS was otherwise negative except as noted in the HPI.  Vitals:    24 1617   BP: 116/75   BP Location: Right arm  "  Patient Position: Sitting   Cuff Size: Adult Large   Pulse: 75   SpO2: 96%   Weight: 112 kg (247 lb)   Height: 1.689 m (5' 6.5\")     Exam:   GENERAL APPEARANCE: Well developed, well nourished, alert, and in no apparent distress.  NECK: supple, no masses, no thyromegaly.  LYMPHATICS: No significant axillary, cervical, or supraclavicular nodes.  RESP: good air flow throughout, -mild bibasilar crackles  CV: Normal S1, S2, regular rhythm, normal rate, no rub, no murmur,  no gallop, no LE edema.   ABDOMEN:  Bowel sounds normal, soft, nontender, no HSM or masses.   MS: extremities normal- no clubbing, no cyanosis.  PSYCH: mentation appears normal. and affect normal/bright  Results: I have reviewed all imaging, PFTs and other relavent tests, please see below for details, PFT and imaging results were reviewed with the patient.  PFTs: Moderate restriction with moderate reduction in diffusing capacity.  Overall there has been improvement in her FVC and DLCO compared to August 2022.    Assessment and plan:    61-year-old female with mild interstitial lung abnormalities in the setting of autoimmune disease.  I have reviewed the outside rheumatology notes.  She has had some improvement in her pulmonary function tests.  Her situation is somewhat complicated by untreated sleep apnea and morbid obesity.  However at this point her pulm function tests are actually improved therefore would not make any changes I will see her back in 6 months with pulmonary function test.  If she is stable I would likely plan to see her back in the spring 2025 and get a follow-up high-resolution CT scan of the chest for 3-year follow-up at that time.  If there is an increase in her symptoms or her pulmonary function test have dropped then I would get the imaging sooner.  I have instructed her to call sooner with any changes in her breathing.  I have refilled her inhalers.        CBC   Recent Labs   Lab Test 08/24/21  1339 01/13/20  1758   RBC  --  " "4.25   HGB 12.8 13.0   HCT  --  39.3   PLT  --  289       Basic Metabolic Panel  No results for input(s): \"NA\", \"POTASSIUM\", \"CHLORIDE\", \"CO2\", \"BUN\", \"CT\", \"GLC\", \"YE\" in the last 95027 hours.    INR  No results for input(s): \"INR\" in the last 21975 hours.    PFT      Latest Ref Rng & Units 3/14/2024     2:37 PM   PFT   FVC L 1.72  P   FEV1 L 1.40  P   FVC% % 55  P   FEV1% % 57  P      P Preliminary result           CC:        Again, thank you for allowing me to participate in the care of your patient.        Sincerely,        David Morris Perlman, MD  "

## 2024-03-14 NOTE — NURSING NOTE
Chief Complaint   Patient presents with    Interstitial Lung Disease (ILD)     ILD follow up      Vitals were taken and medications were reconciled.     Monalisa Riggs RMA  4:28 PM

## 2024-03-14 NOTE — PROGRESS NOTES
Reason for Visit  Ciera Chong is a 63 year old year old female who is being seen for Interstitial Lung Disease (ILD) (ILD follow up )    ILD HPI    Ciera Chong is a 63-year-old female with mild interstitial lung abnormalities in the setting of underlying autoimmune disease.  She has a diagnosis of RA versus psoriatic arthritis.  She has had fairly minimal stable disease and we have just been following her.  She does have chronic oxygen use at 3 L.  She returns clinic today overall stating that she is doing relatively well it has been quite sometime since we have seen her in person and she has not had pulmonary function test done since August 2022.  Overall from a respiratory standpoint she feels she is stable she is using the oxygen 3 L mainly at night and also sometimes with exertion during the day.  Otherwise she does not feel there has been any significant progression of her respiratory symptoms.      Current Outpatient Medications   Medication    albuterol (PROAIR HFA/PROVENTIL HFA/VENTOLIN HFA) 108 (90 Base) MCG/ACT inhaler    albuterol (PROVENTIL) (2.5 MG/3ML) 0.083% neb solution    amLODIPine (NORVASC) 5 MG tablet    azithromycin (ZITHROMAX) 250 MG tablet    azithromycin (ZITHROMAX) 250 MG tablet    buPROPion (WELLBUTRIN SR) 200 MG 12 hr tablet    cetirizine (ZYRTEC) 10 MG tablet    cetirizine (ZYRTEC) 10 MG tablet    cyclobenzaprine (FLEXERIL) 10 MG tablet    diclofenac (VOLTAREN) 1 % topical gel    DULoxetine (CYMBALTA) 30 MG capsule    DULoxetine (CYMBALTA) 60 MG capsule    esomeprazole (NEXIUM) 40 MG DR capsule    fluticasone (FLONASE) 50 MCG/ACT nasal spray    fluticasone-salmeterol (ADVAIR HFA) 230-21 MCG/ACT inhaler    melatonin 5 MG tablet    montelukast (SINGULAIR) 10 MG tablet    Multiple Vitamin (THERA-TABS) TABS    oxyCODONE (OXYCONTIN) 20 MG 12 hr tablet    oxyCODONE-acetaminophen (PERCOCET) 5-325 MG tablet    polyethylene glycol (MIRALAX/GLYCOLAX) powder    pravastatin (PRAVACHOL) 20 MG  tablet    predniSONE (DELTASONE) 1 MG tablet    pregabalin (LYRICA) 150 MG capsule    propranolol (INDERAL) 40 MG tablet    sucralfate (CARAFATE) 1 GM/10ML suspension    SUMAtriptan (IMITREX) 50 MG tablet    zolpidem ER (AMBIEN CR) 6.25 MG CR tablet    mirtazapine (REMERON) 7.5 MG tablet     No current facility-administered medications for this visit.     Facility-Administered Medications Ordered in Other Visits   Medication    sodium chloride (PF) 0.9% PF flush 10 mL     Allergies   Allergen Reactions    Apremilast Nausea and Vomiting    Sulfamethoxazole-Trimethoprim Hives    Sulfa Antibiotics Rash     Past Medical History:   Diagnosis Date    Anxiety     Breast cancer (H)     Chronic pain     Gastric ulcer     ILD (interstitial lung disease) (H)     LVH (left ventricular hypertrophy)     Seronegative rheumatoid arthritis (H)        Past Surgical History:   Procedure Laterality Date    APPENDECTOMY  1978    AS REVISE BREAST RECONSTRUCTION  02/2008    BACK SURGERY  02/26/2009    LUMBAR HEMILAMINECTOMY 2/26/09   Right C7-W7-Adqhfeiziprrptq    BACK SURGERY  03/04/2010    LUMBAR HEMILAMINECTOMY 3/4/10   Left K7-S8-Kijntwftbntvqis, Decompression    BLADDER SUSPENSION      CARPAL TUNNEL RELEASE RT/LT Right 11/11/2010    HYSTERECTOMY  2006    HYSTERECTOMY 2006   with bilateral salpingoophrectomy    LAPAROSCOPY  1990    LAPAROSCOPY 1990   endometrosis    MASTECTOMY, BILATERAL  03/31/2006    MASTECTOMY 03-31-06   bilateral breast    ROTATOR CUFF REPAIR RT/LT  2011    SPINE SURGERY         Social History     Socioeconomic History    Marital status: Single     Spouse name: Not on file    Number of children: Not on file    Years of education: Not on file    Highest education level: Not on file   Occupational History    Not on file   Tobacco Use    Smoking status: Former     Packs/day: 0.50     Years: 15.00     Additional pack years: 0.00     Total pack years: 7.50     Types: Cigarettes     Quit date: 1/1/2000     Years since  "quittin.2    Smokeless tobacco: Never   Substance and Sexual Activity    Alcohol use: Not Currently    Drug use: Not Currently    Sexual activity: Not on file   Other Topics Concern    Parent/sibling w/ CABG, MI or angioplasty before 65F 55M? Not Asked   Social History Narrative    No asbestos, sandblasting, welding, TB, spray painting.         Occupation: Not currently working. 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     Pets: She has a dog at home. No birds.     Allergies: No environmental allergies.     Hobbies: No significant outdoor exposures.     Travel: None    Home: Lives in house with . There are some mold issues in the bathroom, which were abated in 2019. No change in breathing since abatement. She is unsure if she uses a down comforter - not using currently. No feather pillow use. No hot tub use, no musical instrument use.      Social Determinants of Health     Financial Resource Strain: Not on file   Food Insecurity: Not on file   Transportation Needs: Not on file   Physical Activity: Not on file   Stress: Not on file   Social Connections: Not on file   Interpersonal Safety: Not on file   Housing Stability: Not on file       Family History   Problem Relation Age of Onset    Esophageal Cancer Father     Heart Disease Father     Heart Disease Brother     Breast Cancer Sister     Breast Cancer Maternal Aunt         x2 aunts    Ovarian Cancer Maternal Aunt        ROS Pulmonary    A complete ROS was otherwise negative except as noted in the HPI.  Vitals:    24 1617   BP: 116/75   BP Location: Right arm   Patient Position: Sitting   Cuff Size: Adult Large   Pulse: 75   SpO2: 96%   Weight: 112 kg (247 lb)   Height: 1.689 m (5' 6.5\")     Exam:   GENERAL APPEARANCE: Well developed, well nourished, alert, and in no apparent distress.  NECK: supple, no masses, no thyromegaly.  LYMPHATICS: No significant axillary, cervical, or supraclavicular nodes.  RESP: good air " "flow throughout, -mild bibasilar crackles  CV: Normal S1, S2, regular rhythm, normal rate, no rub, no murmur,  no gallop, no LE edema.   ABDOMEN:  Bowel sounds normal, soft, nontender, no HSM or masses.   MS: extremities normal- no clubbing, no cyanosis.  PSYCH: mentation appears normal. and affect normal/bright  Results: I have reviewed all imaging, PFTs and other relavent tests, please see below for details, PFT and imaging results were reviewed with the patient.  PFTs: Moderate restriction with moderate reduction in diffusing capacity.  Overall there has been improvement in her FVC and DLCO compared to August 2022.    Assessment and plan:    61-year-old female with mild interstitial lung abnormalities in the setting of autoimmune disease.  I have reviewed the outside rheumatology notes.  She has had some improvement in her pulmonary function tests.  Her situation is somewhat complicated by untreated sleep apnea and morbid obesity.  However at this point her pulm function tests are actually improved therefore would not make any changes I will see her back in 6 months with pulmonary function test.  If she is stable I would likely plan to see her back in the spring 2025 and get a follow-up high-resolution CT scan of the chest for 3-year follow-up at that time.  If there is an increase in her symptoms or her pulmonary function test have dropped then I would get the imaging sooner.  I have instructed her to call sooner with any changes in her breathing.  I have refilled her inhalers.        CBC   Recent Labs   Lab Test 08/24/21  1339 01/13/20  1758   RBC  --  4.25   HGB 12.8 13.0   HCT  --  39.3   PLT  --  289       Basic Metabolic Panel  No results for input(s): \"NA\", \"POTASSIUM\", \"CHLORIDE\", \"CO2\", \"BUN\", \"CT\", \"GLC\", \"YE\" in the last 16937 hours.    INR  No results for input(s): \"INR\" in the last 56288 hours.    PFT      Latest Ref Rng & Units 3/14/2024     2:37 PM   PFT   FVC L 1.72  P   FEV1 L 1.40  P   FVC% % " 55  P   FEV1% % 57  P      P Preliminary result           CC:

## 2024-03-15 LAB
DLCOUNC-%PRED-PRE: 65 %
DLCOUNC-PRE: 13.57 ML/MIN/MMHG
DLCOUNC-PRED: 20.79 ML/MIN/MMHG
ERV-%PRED-PRE: 7 %
ERV-PRE: 0.08 L
ERV-PRED: 1.17 L
EXPTIME-PRE: 6.43 SEC
FEF2575-%PRED-PRE: 59 %
FEF2575-PRE: 1.29 L/SEC
FEF2575-PRED: 2.16 L/SEC
FEFMAX-%PRED-PRE: 80 %
FEFMAX-PRE: 5.29 L/SEC
FEFMAX-PRED: 6.53 L/SEC
FEV1-%PRED-PRE: 57 %
FEV1-PRE: 1.4 L
FEV1FEV6-PRE: 82 %
FEV1FEV6-PRED: 80 %
FEV1FVC-PRE: 82 %
FEV1FVC-PRED: 80 %
FEV1SVC-PRE: 67 %
FEV1SVC-PRED: 70 %
FIFMAX-PRE: 4.66 L/SEC
FVC-%PRED-PRE: 55 %
FVC-PRE: 1.72 L
FVC-PRED: 3.09 L
IC-%PRED-PRE: 85 %
IC-PRE: 1.99 L
IC-PRED: 2.34 L
VA-%PRED-PRE: 55 %
VA-PRE: 2.83 L
VC-%PRED-PRE: 59 %
VC-PRE: 2.07 L
VC-PRED: 3.47 L

## 2024-03-18 ENCOUNTER — TELEPHONE (OUTPATIENT)
Dept: PULMONOLOGY | Facility: CLINIC | Age: 64
End: 2024-03-18

## 2024-03-18 NOTE — TELEPHONE ENCOUNTER
Prior Authorization Retail Medication Request    Medication/Dose: Albuterol Sulfate  -90 MCG   Diagnosis and ICD code (if different than what is on RX):    New/renewal/insurance change PA/secondary ins. PA:  Previously Tried and Failed:    Rationale:      Insurance   Primary:   Insurance ID:      Secondary (if applicable):  Insurance ID:     Pharmacy Information (if different than what is on RX)  Name:    Phone:    Fax:

## 2024-03-28 NOTE — TELEPHONE ENCOUNTER
PA Initiation    Medication: ALBUTEROL SULFATE  (90 BASE) MCG/ACT IN AERS  Insurance Company: Dave - Phone [phone] Fax [phone]  Pharmacy Filling the Rx: [place] [id number]  Filling Pharmacy Phone: [phone]  Filling Pharmacy Fax:    Start Date: 3/28/2024

## 2024-03-29 NOTE — TELEPHONE ENCOUNTER
Prior Authorization Not Needed per Insurance    Medication: ALBUTEROL SULFATE  (90 BASE) MCG/ACT IN AERS  Insurance Company: Dave - Phone [phone] Fax [phone]  Expected CoPay: $    Pharmacy Filling the Rx: [facility] [address] [place] [address]  Pharmacy Notified: YES  Patient Notified: Pharmacy will notify patient once order is ready.

## 2024-04-24 DIAGNOSIS — R09.02 HYPOXIA: ICD-10-CM

## 2024-04-24 DIAGNOSIS — J84.89 NSIP (NONSPECIFIC INTERSTITIAL PNEUMONIA) (H): ICD-10-CM

## 2024-04-24 DIAGNOSIS — J98.4 SMALL AIRWAYS DISEASE: ICD-10-CM

## 2024-04-24 RX ORDER — AZITHROMYCIN 250 MG/1
250 TABLET, FILM COATED ORAL DAILY
Qty: 90 TABLET | Refills: 0 | Status: SHIPPED | OUTPATIENT
Start: 2024-04-24 | End: 2024-08-19

## 2024-05-30 DIAGNOSIS — R09.02 HYPOXIA: ICD-10-CM

## 2024-05-30 DIAGNOSIS — J98.4 SMALL AIRWAYS DISEASE: ICD-10-CM

## 2024-05-30 DIAGNOSIS — J84.89 NSIP (NONSPECIFIC INTERSTITIAL PNEUMONIA) (H): ICD-10-CM

## 2024-05-30 RX ORDER — MONTELUKAST SODIUM 10 MG/1
1 TABLET ORAL EVERY EVENING
Qty: 90 TABLET | Refills: 0 | Status: SHIPPED | OUTPATIENT
Start: 2024-05-30 | End: 2024-08-19

## 2024-07-10 ENCOUNTER — TELEPHONE (OUTPATIENT)
Dept: PULMONOLOGY | Facility: CLINIC | Age: 64
End: 2024-07-10
Payer: COMMERCIAL

## 2024-07-10 NOTE — TELEPHONE ENCOUNTER
Left Voicemail (1st Attempt) for the patient to call back and schedule the following:    Appointment type: [facility]  Provider: PERLMAN  Return date: 10/2024  Specialty phone number: [phone]  Additional appointment(s) needed: SPIROMETRY/FLOW LOOP  Additonal Notes: N/A

## 2024-07-11 NOTE — TELEPHONE ENCOUNTER
Patient called to reschedule; Dr. Perlman's first available appt around pt's return date of 10/2024 is 11/14. Pt states she would like to be seen much sooner than that to address her current lung issues. She states that she doesn't mind seeing any other ILD provider. Please advise and call pt back.    Writer cancelled 9/12 appt with Dr. Perlman and same day PFT.

## 2024-07-25 ENCOUNTER — TELEPHONE (OUTPATIENT)
Dept: PULMONOLOGY | Facility: CLINIC | Age: 64
End: 2024-07-25
Payer: COMMERCIAL

## 2024-07-25 NOTE — TELEPHONE ENCOUNTER
Patient called several times for appointment in our Pulmonary/ILD Clinic, message left each time with our direct number to call, has not returned any of our calls as of yet.

## 2024-08-17 DIAGNOSIS — J98.4 SMALL AIRWAYS DISEASE: ICD-10-CM

## 2024-08-17 DIAGNOSIS — R09.02 HYPOXIA: ICD-10-CM

## 2024-08-17 DIAGNOSIS — J84.89 NSIP (NONSPECIFIC INTERSTITIAL PNEUMONIA) (H): ICD-10-CM

## 2024-08-18 DIAGNOSIS — J84.89 NSIP (NONSPECIFIC INTERSTITIAL PNEUMONIA) (H): ICD-10-CM

## 2024-08-18 DIAGNOSIS — J98.4 SMALL AIRWAYS DISEASE: ICD-10-CM

## 2024-08-18 DIAGNOSIS — R09.02 HYPOXIA: ICD-10-CM

## 2024-08-19 RX ORDER — MONTELUKAST SODIUM 10 MG/1
1 TABLET ORAL EVERY EVENING
Qty: 90 TABLET | Refills: 0 | Status: SHIPPED | OUTPATIENT
Start: 2024-08-19

## 2024-08-19 RX ORDER — AZITHROMYCIN 250 MG/1
250 TABLET, FILM COATED ORAL DAILY
Qty: 90 TABLET | Refills: 0 | Status: SHIPPED | OUTPATIENT
Start: 2024-08-19

## 2025-03-04 ENCOUNTER — PATIENT OUTREACH (OUTPATIENT)
Dept: ONCOLOGY | Facility: CLINIC | Age: 65
End: 2025-03-04
Payer: COMMERCIAL

## 2025-03-04 ENCOUNTER — TRANSCRIBE ORDERS (OUTPATIENT)
Dept: OTHER | Age: 65
End: 2025-03-04

## 2025-03-04 DIAGNOSIS — J98.6 DIAPHRAGM PARALYSIS: Primary | ICD-10-CM

## 2025-03-04 NOTE — PROGRESS NOTES
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 Coronal reconstructions obtained. High  resolution images of lungs obtained in supine and prone positioning during  inspiration and expiration.        While obtaining CT images, dose reduction techniques were utilized including:    Automated exposure control, adjustment of mA and/or kV according to patient  size, and/or use of iterative reconstruction technique    RADIATION DOSE:  1108 DLP (mGy cm)    COMPARISON:  Chest CT May 26, 2022 most recently.    FINDINGS:  The heart is not enlarged and there is no pericardial effusion.  Mild coronary  artery calcifications.  Thoracic aorta is not aneurysmal.  Central airways  appear patent.  No pathologically enlarged lymph nodes are seen within the  chest or axilla.  There is chronic elevation of the right hemidiaphragm again  seen.  There is linear scarring or atelectasis within the right lower lobe,  unchanged.  Trace scarring within the medial left lower lobe, lingula, and  right middle lobe.  No focal infiltrate, pleural effusion, or pneumothorax.  No  evidence for air trapping on the expiratory images.  Minor peripheral  ground-glass opacities along the lung bases with minor interstitial changes  appears similar to the previous CT.  No traction bronchiectasis or  honeycombing.  No suspicious pulmonary  "nodules.    Limited evaluation of the upper abdomen demonstrates no appreciable acute  findings.  Ingested material and gas within the visualized stomach.  The liver  is hypodense compared to the spleen, compatible with hepatic steatosis.    Review of the osseous structures demonstrates no suspicious focal destructive  osseous lesions.  Degenerative changes are seen within the spine.  Chronic  right 3rd rib fracture deformity.    IMPRESSION:  1. Minor peripheral ground-glass opacities and interstitial changes along the  lung bases, similar to the previous CT. No traction bronchiectasis or  honeycombing.  Findings are suggest NSIP pattern.  2. Chronic elevation of the right hemidiaphragm with linear scarring or  atelectasis within the right lower lobe, unchanged.  3. No superimposed acute findings or lymphadenopathy in the chest.  Exam End: 12/12/24 10:30 AM     Records Location (Care Everywhere, Media, etc.): [facility] ([facility])     RECORDS NEEDED:  Last FIVE years CHEST imaging pushed to PACS from [facility]--thank you!!     Additional testing needed prior to consult:   SNIFF test (Heart Fluoro (aka SNIFF) IMG39)  PFT (Place in comments \"sitting & supine\")      "

## 2025-03-05 ENCOUNTER — PRE VISIT (OUTPATIENT)
Dept: ONCOLOGY | Facility: CLINIC | Age: 65
End: 2025-03-05
Payer: COMMERCIAL

## 2025-03-05 NOTE — TELEPHONE ENCOUNTER
RECORDS STATUS - ALL OTHER DIAGNOSIS      RECORDS RECEIVED FROM:    DIAGNOSIS:   RECORDS STATUS - BREAST    RECORDS REQUESTED FROM:    DIAGNOSIS:    NOTES DETAILS STATUS   OFFICE NOTE from referring provider     OFFICE NOTE from medical oncologist     OFFICE NOTE from surgeon     OFFICE NOTE from radiation oncologist     DISCHARGE SUMMARY from hospital     DISCHARGE REPORT from the ER     OPERATIVE REPORT     MEDICATION LIST     LABS     PATHOLOGY REPORTS  (Tissue diagnosis, Stage, ER/WA percentage positive and intensity of staining, HER2 IHC, FISH, and all biopsies from breast and any distant metastasis)                     PATHOLOGY [employer] TRACKING   Tracking #:    GENONOMIC TESTING     TYPE:   (Next Generation Sequencing, including Foundation One testing, and Oncotype score)     IMAGING (NEED IMAGES & REPORT)     CT SCANS     MRI     MAMMO     ULTRASOUND     PET     BONE SCAN     BRAIN MRI     IMAGE DISC [employer] TRACKING   Tracking #:     [[id number]]    NOTES STATUS DETAILS   OFFICE NOTE from referring provider [facility] 02/27/25: Dr. Don Ortega   OFFICE NOTE from medical oncologist     OFFICE NOTE from other specialist     DISCHARGE SUMMARY from hospital     DISCHARGE REPORT from the ER     OPERATIVE REPORT     MEDICATION LIST     LABS     PATHOLOGY REPORTS     ANYTHING RELATED TO DIAGNOSIS     PATHOLOGY [employer] TRACKING   Tracking #:   GENONOMIC TESTING     TYPE:     IMAGING (NEED IMAGES & REPORT)     CT SCANS PACS/Req 03/05 [facility]:  12/12/24: CT Chest    PACS:  05/26/22-11/03/17: CT Chest   MRI     XRAYS PACS/Req 03/05 [facility]:  02/21/25: XR Chest    PACS:  10/21/20: XR Chest   ULTRASOUND     PET     IMAGE DISC [employer] TRACKING   Tracking #:

## 2025-08-14 ENCOUNTER — MEDICAL CORRESPONDENCE (OUTPATIENT)
Dept: HEALTH INFORMATION MANAGEMENT | Facility: CLINIC | Age: 65
End: 2025-08-14
Payer: COMMERCIAL

## 2025-08-15 ENCOUNTER — TRANSCRIBE ORDERS (OUTPATIENT)
Dept: OTHER | Age: 65
End: 2025-08-15

## 2025-08-15 DIAGNOSIS — J98.6 ACQUIRED ELEVATED DIAPHRAGM: Primary | ICD-10-CM
